# Patient Record
Sex: FEMALE | Race: BLACK OR AFRICAN AMERICAN | NOT HISPANIC OR LATINO | Employment: UNEMPLOYED | RURAL
[De-identification: names, ages, dates, MRNs, and addresses within clinical notes are randomized per-mention and may not be internally consistent; named-entity substitution may affect disease eponyms.]

---

## 2017-01-05 RX ORDER — DIPHENHYDRAMINE HCL 25 MG
TABLET ORAL
Qty: 30 TABLET | Refills: 3 | Status: SHIPPED | OUTPATIENT
Start: 2017-01-05 | End: 2017-01-10

## 2017-01-10 ENCOUNTER — OFFICE VISIT (OUTPATIENT)
Dept: FAMILY MEDICINE CLINIC | Facility: CLINIC | Age: 42
End: 2017-01-10

## 2017-01-10 VITALS
BODY MASS INDEX: 25.33 KG/M2 | WEIGHT: 152 LBS | HEIGHT: 65 IN | HEART RATE: 99 BPM | TEMPERATURE: 98.2 F | SYSTOLIC BLOOD PRESSURE: 120 MMHG | RESPIRATION RATE: 16 BRPM | DIASTOLIC BLOOD PRESSURE: 80 MMHG

## 2017-01-10 DIAGNOSIS — E21.5 PARATHYROID ABNORMALITY (HCC): ICD-10-CM

## 2017-01-10 DIAGNOSIS — I10 ESSENTIAL HYPERTENSION: ICD-10-CM

## 2017-01-10 DIAGNOSIS — J30.2 SEASONAL ALLERGIC RHINITIS, UNSPECIFIED ALLERGIC RHINITIS TRIGGER: ICD-10-CM

## 2017-01-10 DIAGNOSIS — R51.9 NONINTRACTABLE HEADACHE, UNSPECIFIED CHRONICITY PATTERN, UNSPECIFIED HEADACHE TYPE: Primary | ICD-10-CM

## 2017-01-10 PROCEDURE — 99214 OFFICE O/P EST MOD 30 MIN: CPT | Performed by: NURSE PRACTITIONER

## 2017-01-10 RX ORDER — DIPHENHYDRAMINE HCL 25 MG
25 CAPSULE ORAL EVERY 4 HOURS PRN
Qty: 30 CAPSULE | Refills: 5 | Status: SHIPPED | OUTPATIENT
Start: 2017-01-10 | End: 2017-08-22 | Stop reason: SDUPTHER

## 2017-01-10 RX ORDER — IBUPROFEN 600 MG/1
600 TABLET ORAL EVERY 8 HOURS PRN
Qty: 90 TABLET | Refills: 3 | Status: SHIPPED | OUTPATIENT
Start: 2017-01-10 | End: 2017-08-16 | Stop reason: SDUPTHER

## 2017-01-10 RX ORDER — NORTRIPTYLINE HYDROCHLORIDE 50 MG/1
50 CAPSULE ORAL NIGHTLY
Qty: 30 CAPSULE | Refills: 3 | Status: SHIPPED | OUTPATIENT
Start: 2017-01-10 | End: 2019-10-17

## 2017-01-10 RX ORDER — ALCOHOL 62 ML/100ML
LIQUID TOPICAL
Refills: 0 | COMMUNITY
Start: 2016-10-19 | End: 2017-05-12 | Stop reason: SDUPTHER

## 2017-01-10 NOTE — PROGRESS NOTES
Subjective   Swapna Schaffer is a 41 y.o. female.     Headache    This is a chronic problem. The current episode started more than 1 month ago. The problem occurs daily. The problem has been unchanged. The pain is located in the frontal region. The pain does not radiate. The pain quality is similar to prior headaches. The quality of the pain is described as band-like. The pain is at a severity of 2/10. The pain is mild. Associated symptoms include weight loss. Pertinent negatives include no nausea. The symptoms are aggravated by bright light. She has tried NSAIDs (pamelor) for the symptoms. The treatment provided moderate relief.   Weight Loss   This is a recurrent (has lost 4 lbs since she was last seen.  has a hx of parathyroid disease.  had partial removal of parathyroid about 6 months ago.  was done in [place] and is not following up with an endocrinologist.) problem. The current episode started more than 1 year ago. Associated symptoms include headaches. Pertinent negatives include no nausea. Treatments tried: has had parathyroidectomy. The treatment provided moderate relief.        The following portions of the patient's history were reviewed and updated as appropriate: allergies, current medications, past family history, past medical history, past social history, past surgical history and problem list.    Review of Systems   Constitutional: Positive for weight loss.   Respiratory: Negative.    Cardiovascular: Negative.    Gastrointestinal: Negative for nausea.   Skin: Negative.    Neurological: Positive for headaches.   Psychiatric/Behavioral: Negative.        Objective   Physical Exam   Constitutional: She is oriented to person, place, and time. She appears well-developed and well-nourished. No distress.   HENT:   Head: Normocephalic and atraumatic.   Eyes: EOM are normal. Pupils are equal, round, and reactive to light.   Neck: Normal range of motion. Neck supple. No thyromegaly present.    Cardiovascular: Normal rate, regular rhythm and normal heart sounds.  Exam reveals no friction rub.    No murmur heard.  Pulmonary/Chest: Effort normal and breath sounds normal. No respiratory distress. She has no wheezes. She has no rales.   Abdominal: Soft. Bowel sounds are normal.   Musculoskeletal: Normal range of motion.   Neurological: She is alert and oriented to person, place, and time.   Skin: Skin is warm and dry.   Psychiatric: She has a normal mood and affect. Thought content normal.   Nursing note and vitals reviewed.      Assessment/Plan   Swapna was seen today for headache and weight loss.    Diagnoses and all orders for this visit:    Nonintractable headache, unspecified chronicity pattern, unspecified headache type  -     nortriptyline (PAMELOR) 50 MG capsule; Take 1 capsule by mouth Every Night.  -     ibuprofen (ADVIL,MOTRIN) 600 MG tablet; Take 1 tablet by mouth Every 8 (Eight) Hours As Needed for mild pain (1-3). Take 1 tablet 3 times per day with food.    Seasonal allergic rhinitis, unspecified allergic rhinitis trigger  -     diphenhydrAMINE (BENADRYL) 25 mg capsule; Take 1 capsule by mouth Every 4 (Four) Hours As Needed for itching. Take 1/2 tablet every 4 hours as needed.    Parathyroid abnormality  -     PTH, Intact; Future  -     Calcium; Future  -     TSH; Future    Essential hypertension  -     Comprehensive Metabolic Panel; Future    will have her increase the pamelor to 50mg.  Also she will be referred to endocrinologist due to her past hx of parathyroid disease.  New labs will be drawn.

## 2017-01-10 NOTE — MR AVS SNAPSHOT
Swapna Schaffer   1/10/2017 4:00 PM   Office Visit    Dept Phone:  [phone]   Encounter #:  [id number]    Provider:  SIS Solis   Department:  [facility]                Your Full Care Plan              Today's Medication Changes          These changes are accurate as of: 1/10/17  4:35 PM.  If you have any questions, ask your nurse or doctor.               Medication(s)that have changed:     diphenhydrAMINE 25 mg capsule   Commonly known as:  BENADRYL   Take 1 capsule by mouth Every 4 (Four) Hours As Needed for itching. Take 1/2 tablet every 4 hours as needed.   What changed:    - how much to take  - when to take this  - reasons to take this   Changed by:  SIS Solis       nortriptyline 50 MG capsule   Commonly known as:  PAMELOR   Take 1 capsule by mouth Every Night.   What changed:    - medication strength  - how much to take   Changed by:  SIS Solis         Stop taking medication(s)listed here:     RA NIGHTTIME SLEEP AID 25 MG tablet   Generic drug:  diphenhydrAMINE   Stopped by:  SIS Solis                Where to Get Your Medications      These medications were sent to [facility]-[address] [place] [address] - [address] [facility] - [phone]  - [phone] [address] [address]     Phone:  [phone]     diphenhydrAMINE 25 mg capsule    ibuprofen 600 MG tablet    nortriptyline 50 MG capsule                  Your Updated Medication List          This list is accurate as of: 1/10/17  4:35 PM.  Always use your most recent med list.                CLARITIN-D 24 HOUR PO       cyclobenzaprine 5 MG tablet   Commonly known as:  FLEXERIL   Take 1 tablet by mouth 2 (Two) Times a Day As Needed for muscle spasms. Take 1 or 2 tablets at bedtime.       diphenhydrAMINE 25 mg capsule   Commonly known as:  BENADRYL   Take 1 capsule by mouth  Every 4 (Four) Hours As Needed for itching. Take 1/2 tablet every 4 hours as needed.       ferrous sulfate 325 (65 FE) MG tablet       ibuprofen 600 MG tablet   Commonly known as:  ADVIL,MOTRIN   Take 1 tablet by mouth Every 8 (Eight) Hours As Needed for mild pain (1-3). Take 1 tablet 3 times per day with food.       losartan 50 MG tablet   Commonly known as:  COZAAR   Take 1 tablet by mouth Daily.       nortriptyline 50 MG capsule   Commonly known as:  PAMELOR   Take 1 capsule by mouth Every Night.       RA LORATADINE 10 MG tablet   Generic drug:  loratadine               You Were Diagnosed With        Codes Comments    Nonintractable headache, unspecified chronicity pattern, unspecified headache type    -  Primary ICD-10-CM: R51  ICD-9-CM: 784.0     Seasonal allergic rhinitis, unspecified allergic rhinitis trigger     ICD-10-CM: J30.2  ICD-9-CM: 477.9     Parathyroid abnormality     ICD-10-CM: E21.5  ICD-9-CM: 252.9     Essential hypertension     ICD-10-CM: I10  ICD-9-CM: 401.9       Instructions     None    Patient Instructions History      Upcoming Appointments     Visit Type Date Time Department    OFFICE VISIT 1/10/2017  4:00 PM [facility]    OFFICE VISIT 4/10/2017  4:00 PM [facility]    PAP SMEAR/PELVIC EXAM 10/3/2017  2:30 PM [facility]      [facility] Signup     Our records indicate that you have declined [facility] signup. If you would like to sign up for [email or web address], please email [email or web address] or call [phone] to obtain an activation code.             Other Info from Your Visit           Your Appointments     Apr 10, 2017  4:00 PM CDT   Office Visit with SIS Solis   [facility] (--)    [place] [address] [place] [address]  [facility] [address] [phone]           Arrive 15 minutes prior to appointment.            Oct 03, 2017  2:30 PM CDT   Pap  "Smear/Pelvic Exam with Clark Ryder MD   [facility] (--)    [address] [place] [address] [address]  [facility] [address] [place] [id number] [phone]              Allergies     Naproxen      Vicodin [Hydrocodone-acetaminophen]        Reason for Visit     Anxiety re.ck      Vital Signs     Blood Pressure Pulse Temperature Respirations Height Weight    120/80 99 98.2 °F (36.8 °C) 16 65\" (165.1 cm) 152 lb (68.9 kg)    Body Mass Index Smoking Status                25.29 kg/m2 Current Every Day Smoker          Problems and Diagnoses Noted     High blood pressure    Nonintractable headache    Seasonal allergic reaction        Parathyroid abnormality            "

## 2017-02-07 ENCOUNTER — LAB (OUTPATIENT)
Dept: LAB | Facility: CLINIC | Age: 42
End: 2017-02-07

## 2017-02-07 DIAGNOSIS — E21.5 PARATHYROID ABNORMALITY (HCC): ICD-10-CM

## 2017-02-07 DIAGNOSIS — I10 ESSENTIAL HYPERTENSION: ICD-10-CM

## 2017-02-07 PROCEDURE — 36415 COLL VENOUS BLD VENIPUNCTURE: CPT | Performed by: NURSE PRACTITIONER

## 2017-02-07 PROCEDURE — 83970 ASSAY OF PARATHORMONE: CPT | Performed by: NURSE PRACTITIONER

## 2017-02-07 PROCEDURE — 80053 COMPREHEN METABOLIC PANEL: CPT | Performed by: NURSE PRACTITIONER

## 2017-02-07 PROCEDURE — 84443 ASSAY THYROID STIM HORMONE: CPT | Performed by: NURSE PRACTITIONER

## 2017-02-08 LAB
ALBUMIN SERPL-MCNC: 3.9 G/DL (ref 3.4–4.8)
ALBUMIN/GLOB SERPL: 1.4 G/DL (ref 1.1–1.8)
ALP SERPL-CCNC: 67 U/L (ref 38–126)
ALT SERPL W P-5'-P-CCNC: 23 U/L (ref 9–52)
ANION GAP SERPL CALCULATED.3IONS-SCNC: 10 MMOL/L (ref 5–15)
AST SERPL-CCNC: 16 U/L (ref 14–36)
BILIRUB SERPL-MCNC: 0.4 MG/DL (ref 0.2–1.3)
BUN BLD-MCNC: 10 MG/DL (ref 7–21)
BUN/CREAT SERPL: 15.4 (ref 7–25)
CALCIUM SPEC-SCNC: 10.8 MG/DL (ref 8.4–10.2)
CHLORIDE SERPL-SCNC: 105 MMOL/L (ref 95–110)
CO2 SERPL-SCNC: 23 MMOL/L (ref 22–31)
CREAT BLD-MCNC: 0.65 MG/DL (ref 0.5–1)
GFR SERPL CREATININE-BSD FRML MDRD: 121 ML/MIN/1.73 (ref 58–135)
GLOBULIN UR ELPH-MCNC: 2.8 GM/DL (ref 2.3–3.5)
GLUCOSE BLD-MCNC: 90 MG/DL (ref 60–100)
POTASSIUM BLD-SCNC: 4.2 MMOL/L (ref 3.5–5.1)
PROT SERPL-MCNC: 6.7 G/DL (ref 6.3–8.6)
SODIUM BLD-SCNC: 138 MMOL/L (ref 137–145)
TSH SERPL DL<=0.05 MIU/L-ACNC: 0.82 MIU/ML (ref 0.46–4.68)

## 2017-02-09 LAB — PTH-INTACT SERPL-MCNC: 100 PG/ML (ref 15–65)

## 2017-04-10 ENCOUNTER — OFFICE VISIT (OUTPATIENT)
Dept: FAMILY MEDICINE CLINIC | Facility: CLINIC | Age: 42
End: 2017-04-10

## 2017-04-10 VITALS
SYSTOLIC BLOOD PRESSURE: 126 MMHG | HEART RATE: 94 BPM | HEIGHT: 65 IN | DIASTOLIC BLOOD PRESSURE: 84 MMHG | BODY MASS INDEX: 24.26 KG/M2 | RESPIRATION RATE: 16 BRPM | TEMPERATURE: 95.6 F | OXYGEN SATURATION: 99 % | WEIGHT: 145.6 LBS

## 2017-04-10 DIAGNOSIS — R63.4 WEIGHT LOSS: Primary | ICD-10-CM

## 2017-04-10 DIAGNOSIS — E21.5 PARATHYROID ABNORMALITY (HCC): ICD-10-CM

## 2017-04-10 PROCEDURE — 99213 OFFICE O/P EST LOW 20 MIN: CPT | Performed by: NURSE PRACTITIONER

## 2017-04-10 RX ORDER — CYPROHEPTADINE HYDROCHLORIDE 4 MG/1
4 TABLET ORAL 2 TIMES DAILY
Qty: 60 TABLET | Refills: 1 | Status: SHIPPED | OUTPATIENT
Start: 2017-04-10 | End: 2017-05-12 | Stop reason: SDUPTHER

## 2017-04-11 PROBLEM — R63.4 WEIGHT LOSS: Status: ACTIVE | Noted: 2017-04-11

## 2017-04-11 NOTE — PROGRESS NOTES
Subjective   Swapna Schaffer is a 41 y.o. female.     HPI Comments: Here today for karma.  She cont to steadily loose weight in spite of not trying.  She has a hx of parathyroid disease.  Has an appointment with endocrinology, but this appointment has been cancelled and rescheduled to July by the endo.  She does not feel that she can wait that long.  parathyroids were removed in 2105.    Weight Loss   This is a new (has lost about 11 lbs since december) problem. The current episode started more than 1 month ago. The problem occurs constantly. The problem has been waxing and waning. Associated symptoms include fatigue. Pertinent negatives include no abdominal pain, anorexia, arthralgias, change in bowel habit, chest pain, chills, congestion, coughing, diaphoresis, fever, headaches, joint swelling, myalgias, nausea, neck pain, numbness, rash, sore throat, swollen glands, urinary symptoms, vertigo, visual change, vomiting or weakness. Nothing aggravates the symptoms. She has tried nothing for the symptoms. The treatment provided no relief.        The following portions of the patient's history were reviewed and updated as appropriate: allergies, current medications, past family history, past medical history, past social history, past surgical history and problem list.    Review of Systems   Constitutional: Positive for fatigue and weight loss. Negative for chills, diaphoresis and fever.   HENT: Negative.  Negative for congestion and sore throat.    Respiratory: Negative.  Negative for cough.    Cardiovascular: Negative.  Negative for chest pain.   Gastrointestinal: Negative for abdominal pain, anorexia, change in bowel habit, nausea and vomiting.   Musculoskeletal: Negative.  Negative for arthralgias, joint swelling, myalgias and neck pain.   Skin: Negative.  Negative for rash.   Neurological: Negative.  Negative for vertigo, weakness, numbness and headaches.   Psychiatric/Behavioral: Negative.         Objective   Physical Exam   Constitutional: She is oriented to person, place, and time. She appears well-developed and well-nourished. No distress.   HENT:   Head: Normocephalic.   Eyes: EOM are normal. Pupils are equal, round, and reactive to light.   Neck: Normal range of motion. Neck supple. No thyromegaly present.   Cardiovascular: Normal rate, regular rhythm and normal heart sounds.  Exam reveals no friction rub.    No murmur heard.  Pulmonary/Chest: Effort normal and breath sounds normal. No respiratory distress. She has no wheezes. She has no rales.   Abdominal: Soft.   Musculoskeletal: Normal range of motion.   Neurological: She is alert and oriented to person, place, and time.   Skin: Skin is warm and dry.   Psychiatric: She has a normal mood and affect. Thought content normal.   Nursing note and vitals reviewed.      Assessment/Plan   Swapna was seen today for follow-up.    Diagnoses and all orders for this visit:    Weight loss  -     cyproheptadine (PERIACTIN) 4 MG tablet; Take 1 tablet by mouth 2 (Two) Times a Day.    Parathyroid abnormality    will start her on periactin and see if this will stabilize her weight.  Have reviewed prev labs.  Will try to get her back in to see flori in [place].

## 2017-05-12 ENCOUNTER — OFFICE VISIT (OUTPATIENT)
Dept: FAMILY MEDICINE CLINIC | Facility: CLINIC | Age: 42
End: 2017-05-12

## 2017-05-12 VITALS
RESPIRATION RATE: 18 BRPM | WEIGHT: 157 LBS | TEMPERATURE: 98 F | HEART RATE: 104 BPM | HEIGHT: 65 IN | OXYGEN SATURATION: 99 % | BODY MASS INDEX: 26.16 KG/M2 | SYSTOLIC BLOOD PRESSURE: 140 MMHG | DIASTOLIC BLOOD PRESSURE: 82 MMHG

## 2017-05-12 DIAGNOSIS — I10 ESSENTIAL HYPERTENSION: ICD-10-CM

## 2017-05-12 DIAGNOSIS — R63.4 WEIGHT LOSS: ICD-10-CM

## 2017-05-12 PROCEDURE — 99213 OFFICE O/P EST LOW 20 MIN: CPT | Performed by: NURSE PRACTITIONER

## 2017-05-12 RX ORDER — LOSARTAN POTASSIUM 50 MG/1
50 TABLET ORAL DAILY
Qty: 30 TABLET | Refills: 5 | Status: SHIPPED | OUTPATIENT
Start: 2017-05-12 | End: 2019-04-26 | Stop reason: SDUPTHER

## 2017-05-12 RX ORDER — CYPROHEPTADINE HYDROCHLORIDE 4 MG/1
4 TABLET ORAL 2 TIMES DAILY
Qty: 60 TABLET | Refills: 5 | Status: SHIPPED | OUTPATIENT
Start: 2017-05-12 | End: 2017-08-22 | Stop reason: SDUPTHER

## 2017-05-16 DIAGNOSIS — E21.5 PARATHYROID DISEASE (HCC): Primary | ICD-10-CM

## 2017-08-16 DIAGNOSIS — R51.9 NONINTRACTABLE HEADACHE, UNSPECIFIED CHRONICITY PATTERN, UNSPECIFIED HEADACHE TYPE: ICD-10-CM

## 2017-08-17 RX ORDER — IBUPROFEN 600 MG/1
TABLET ORAL
Qty: 90 TABLET | Refills: 3 | Status: SHIPPED | OUTPATIENT
Start: 2017-08-17 | End: 2017-08-22 | Stop reason: SDUPTHER

## 2017-08-22 ENCOUNTER — OFFICE VISIT (OUTPATIENT)
Dept: FAMILY MEDICINE CLINIC | Facility: CLINIC | Age: 42
End: 2017-08-22

## 2017-08-22 VITALS
WEIGHT: 169 LBS | RESPIRATION RATE: 16 BRPM | DIASTOLIC BLOOD PRESSURE: 86 MMHG | SYSTOLIC BLOOD PRESSURE: 117 MMHG | HEIGHT: 65 IN | HEART RATE: 101 BPM | BODY MASS INDEX: 28.16 KG/M2 | TEMPERATURE: 97.9 F | OXYGEN SATURATION: 98 %

## 2017-08-22 DIAGNOSIS — I10 ESSENTIAL HYPERTENSION: Primary | ICD-10-CM

## 2017-08-22 DIAGNOSIS — J30.2 SEASONAL ALLERGIC RHINITIS, UNSPECIFIED ALLERGIC RHINITIS TRIGGER: ICD-10-CM

## 2017-08-22 DIAGNOSIS — R51.9 NONINTRACTABLE HEADACHE, UNSPECIFIED CHRONICITY PATTERN, UNSPECIFIED HEADACHE TYPE: ICD-10-CM

## 2017-08-22 DIAGNOSIS — R63.4 WEIGHT LOSS: ICD-10-CM

## 2017-08-22 PROCEDURE — 99214 OFFICE O/P EST MOD 30 MIN: CPT | Performed by: NURSE PRACTITIONER

## 2017-08-22 RX ORDER — CYPROHEPTADINE HYDROCHLORIDE 4 MG/1
4 TABLET ORAL 2 TIMES DAILY
Qty: 60 TABLET | Refills: 5 | Status: SHIPPED | OUTPATIENT
Start: 2017-08-22 | End: 2018-01-02 | Stop reason: SDUPTHER

## 2017-08-22 RX ORDER — IBUPROFEN 600 MG/1
600 TABLET ORAL EVERY 6 HOURS PRN
Qty: 90 TABLET | Refills: 3 | Status: SHIPPED | OUTPATIENT
Start: 2017-08-22 | End: 2018-04-05 | Stop reason: SDUPTHER

## 2017-08-22 RX ORDER — DIPHENHYDRAMINE HCL 25 MG
25 CAPSULE ORAL EVERY 4 HOURS PRN
Qty: 30 CAPSULE | Refills: 5 | Status: SHIPPED | OUTPATIENT
Start: 2017-08-22 | End: 2018-01-02 | Stop reason: SDUPTHER

## 2017-08-22 NOTE — PROGRESS NOTES
Subjective   Swapna Schaffer is a 41 y.o. female.     HPI Comments: Here today needing refills on some of her meds.  She has stopped losing weight and is feeling well.  Needs refills.    Hypertension   This is a chronic problem. The current episode started more than 1 year ago. The problem is controlled. Pertinent negatives include no anxiety, blurred vision, chest pain, headaches, malaise/fatigue, neck pain, orthopnea, palpitations, peripheral edema, PND, shortness of breath or sweats. There are no known risk factors for coronary artery disease. Past treatments include angiotensin blockers. There are no compliance problems.  There is no history of angina, kidney disease, CAD/MI, CVA, heart failure, left ventricular hypertrophy, PVD or retinopathy.        The following portions of the patient's history were reviewed and updated as appropriate: allergies, current medications, past family history, past medical history, past social history, past surgical history and problem list.    Review of Systems   Constitutional: Negative.  Negative for malaise/fatigue.   HENT: Negative.    Eyes: Negative for blurred vision.   Respiratory: Negative.  Negative for shortness of breath.    Cardiovascular: Negative.  Negative for chest pain, palpitations, orthopnea and PND.   Musculoskeletal: Negative.  Negative for neck pain.   Skin: Negative.    Neurological: Negative.  Negative for headaches.   Psychiatric/Behavioral: Negative.        Objective   Physical Exam   Constitutional: She is oriented to person, place, and time. She appears well-developed and well-nourished. No distress.   HENT:   Head: Normocephalic.   Eyes: Pupils are equal, round, and reactive to light.   Neck: Normal range of motion. Neck supple. No thyromegaly present.   Cardiovascular: Normal rate, regular rhythm and normal heart sounds.  Exam reveals no friction rub.    No murmur heard.  Pulmonary/Chest: Effort normal and breath sounds normal. No  respiratory distress. She has no wheezes. She has no rales.   Abdominal: Soft.   Musculoskeletal: Normal range of motion.   Neurological: She is alert and oriented to person, place, and time.   Skin: Skin is warm and dry.   Psychiatric: She has a normal mood and affect. Thought content normal.   Nursing note and vitals reviewed.      Assessment/Plan   Swapna was seen today for hypertension and low iron.    Diagnoses and all orders for this visit:    Essential hypertension    Nonintractable headache, unspecified chronicity pattern, unspecified headache type  -     ibuprofen (ADVIL,MOTRIN) 600 MG tablet; Take 1 tablet by mouth Every 6 (Six) Hours As Needed for Mild Pain .    Weight loss  -     cyproheptadine (PERIACTIN) 4 MG tablet; Take 1 tablet by mouth 2 (Two) Times a Day.    Seasonal allergic rhinitis, unspecified allergic rhinitis trigger  -     diphenhydrAMINE (BENADRYL) 25 mg capsule; Take 1 capsule by mouth Every 4 (Four) Hours As Needed for Itching. Take 1/2 tablet every 4 hours as needed.      She is given refills on maintenance meds.

## 2018-01-02 DIAGNOSIS — R63.4 WEIGHT LOSS: ICD-10-CM

## 2018-01-02 RX ORDER — DIPHENHYDRAMINE HCL 25 MG
25 CAPSULE ORAL EVERY 4 HOURS PRN
Qty: 30 CAPSULE | Refills: 5 | Status: SHIPPED | OUTPATIENT
Start: 2018-01-02 | End: 2018-03-29 | Stop reason: SDUPTHER

## 2018-01-02 RX ORDER — CYPROHEPTADINE HYDROCHLORIDE 4 MG/1
4 TABLET ORAL 2 TIMES DAILY PRN
Qty: 60 TABLET | Refills: 5 | Status: SHIPPED | OUTPATIENT
Start: 2018-01-02 | End: 2018-07-13 | Stop reason: SDUPTHER

## 2018-01-03 RX ORDER — CYPROHEPTADINE HYDROCHLORIDE 4 MG/1
TABLET ORAL
Qty: 60 TABLET | Refills: 5 | Status: SHIPPED | OUTPATIENT
Start: 2018-01-03 | End: 2018-07-13 | Stop reason: SDUPTHER

## 2018-03-15 ENCOUNTER — OFFICE VISIT (OUTPATIENT)
Dept: FAMILY MEDICINE CLINIC | Facility: CLINIC | Age: 43
End: 2018-03-15

## 2018-03-15 VITALS
DIASTOLIC BLOOD PRESSURE: 95 MMHG | HEIGHT: 65 IN | BODY MASS INDEX: 28.66 KG/M2 | SYSTOLIC BLOOD PRESSURE: 126 MMHG | RESPIRATION RATE: 20 BRPM | TEMPERATURE: 98.1 F | HEART RATE: 115 BPM | OXYGEN SATURATION: 99 % | WEIGHT: 172 LBS

## 2018-03-15 DIAGNOSIS — J01.10 ACUTE FRONTAL SINUSITIS, RECURRENCE NOT SPECIFIED: Primary | ICD-10-CM

## 2018-03-15 PROCEDURE — 96372 THER/PROPH/DIAG INJ SC/IM: CPT | Performed by: NURSE PRACTITIONER

## 2018-03-15 PROCEDURE — 99214 OFFICE O/P EST MOD 30 MIN: CPT | Performed by: NURSE PRACTITIONER

## 2018-03-15 RX ORDER — AZITHROMYCIN 250 MG/1
TABLET, FILM COATED ORAL
Refills: 0 | COMMUNITY
Start: 2018-03-12 | End: 2019-08-27

## 2018-03-15 RX ORDER — CEFUROXIME AXETIL 500 MG/1
500 TABLET ORAL 2 TIMES DAILY
Qty: 20 TABLET | Refills: 0 | Status: SHIPPED | OUTPATIENT
Start: 2018-03-15 | End: 2019-08-27

## 2018-03-15 RX ORDER — BENZONATATE 100 MG/1
CAPSULE ORAL
Refills: 0 | COMMUNITY
Start: 2018-03-12 | End: 2019-08-27

## 2018-03-15 RX ORDER — BETAMETHASONE SODIUM PHOSPHATE AND BETAMETHASONE ACETATE 3; 3 MG/ML; MG/ML
12 INJECTION, SUSPENSION INTRA-ARTICULAR; INTRALESIONAL; INTRAMUSCULAR; SOFT TISSUE EVERY 24 HOURS
Status: SHIPPED | OUTPATIENT
Start: 2018-03-15 | End: 2018-03-17

## 2018-03-15 RX ADMIN — BETAMETHASONE SODIUM PHOSPHATE AND BETAMETHASONE ACETATE 12 MG: 3; 3 INJECTION, SUSPENSION INTRA-ARTICULAR; INTRALESIONAL; INTRAMUSCULAR; SOFT TISSUE at 16:09

## 2018-03-15 NOTE — PROGRESS NOTES
Subjective   Swapna Schaffer is a 42 y.o. female.     Here today with cough, congestion and d/c nasal drainage that started about a week ago.  4 days ago she went to the er and they gave her a z pack and tessalon perles.  This has not helped.  Cont to have pain in her frontal sinus area.      Cough   This is a new problem. The current episode started in the past 7 days. The problem has been unchanged. The problem occurs every few minutes. The cough is productive of sputum. Associated symptoms include headaches, nasal congestion, postnasal drip and rhinorrhea. Pertinent negatives include no chest pain, ear congestion, ear pain, fever, heartburn, hemoptysis, myalgias, rash, sore throat, shortness of breath, sweats, weight loss or wheezing. Nothing aggravates the symptoms. Treatments tried: z pack and tessalon perles. The treatment provided no relief.   Sinusitis   This is a new problem. The current episode started in the past 7 days. The problem is unchanged. There has been no fever. Her pain is at a severity of 6/10. The pain is moderate. Associated symptoms include congestion, coughing, headaches and sinus pressure. Pertinent negatives include no diaphoresis, ear pain, hoarse voice, neck pain, shortness of breath, sneezing, sore throat or swollen glands. Past treatments include antibiotics (tessalon perles). The treatment provided mild relief.        The following portions of the patient's history were reviewed and updated as appropriate: allergies, current medications, past family history, past medical history, past social history, past surgical history and problem list.    Review of Systems   Constitutional: Negative.  Negative for diaphoresis, fever and unexpected weight loss.   HENT: Positive for congestion, postnasal drip, rhinorrhea and sinus pressure. Negative for ear pain, hoarse voice, sneezing and sore throat.    Respiratory: Positive for cough. Negative for hemoptysis, shortness of breath and  wheezing.    Cardiovascular: Negative.  Negative for chest pain.   Musculoskeletal: Negative.  Negative for myalgias and neck pain.   Skin: Negative.  Negative for rash.   Neurological: Positive for headaches.   Psychiatric/Behavioral: Negative.        Objective   Physical Exam   Constitutional: She is oriented to person, place, and time. She appears well-developed and well-nourished. No distress.   HENT:   Head: Normocephalic.   Right Ear: Hearing, tympanic membrane, external ear and ear canal normal. Tympanic membrane is not injected.   Left Ear: Hearing, tympanic membrane, external ear and ear canal normal. Tympanic membrane is not injected.   Nose: Rhinorrhea present. Right sinus exhibits frontal sinus tenderness. Right sinus exhibits no maxillary sinus tenderness. Left sinus exhibits frontal sinus tenderness. Left sinus exhibits no maxillary sinus tenderness.   Mouth/Throat: Oropharyngeal exudate present.   Eyes: Pupils are equal, round, and reactive to light.   Neck: Normal range of motion. Neck supple.   Cardiovascular: Normal rate, regular rhythm and normal heart sounds.  Exam reveals no friction rub.    No murmur heard.  Pulmonary/Chest: Effort normal and breath sounds normal. No respiratory distress. She has no wheezes. She has no rales.   Abdominal: Soft.   Musculoskeletal: Normal range of motion.   Neurological: She is alert and oriented to person, place, and time.   Skin: Skin is warm and dry.   Psychiatric: She has a normal mood and affect. Thought content normal.   Nursing note and vitals reviewed.        Assessment/Plan   Swapna was seen today for nasal congestion and cough.    Diagnoses and all orders for this visit:    Acute frontal sinusitis, recurrence not specified  -     cefTRIAXone (ROCEPHIN) 1 g in lidocaine PF 1% (XYLOCAINE) IM only syringe; Inject 4 mL into the shoulder, thigh, or buttocks 1 (One) Time.  -     betamethasone acetate-betamethasone sodium phosphate (CELESTONE SOLUSPAN)  injection 12 mg; Inject 2 mL into the shoulder, thigh, or buttocks Daily.    Other orders  -     cefuroxime (CEFTIN) 500 MG tablet; Take 1 tablet by mouth 2 (Two) Times a Day.  -     loratadine-pseudoephedrine (CLARITIN-D 24 HOUR)  MG per 24 hr tablet; Take 1 tablet by mouth Daily.

## 2018-03-29 ENCOUNTER — OFFICE VISIT (OUTPATIENT)
Dept: FAMILY MEDICINE CLINIC | Facility: CLINIC | Age: 43
End: 2018-03-29

## 2018-03-29 VITALS
RESPIRATION RATE: 20 BRPM | OXYGEN SATURATION: 98 % | HEIGHT: 65 IN | TEMPERATURE: 98.3 F | HEART RATE: 82 BPM | SYSTOLIC BLOOD PRESSURE: 132 MMHG | DIASTOLIC BLOOD PRESSURE: 84 MMHG | BODY MASS INDEX: 28.66 KG/M2 | WEIGHT: 172 LBS

## 2018-03-29 DIAGNOSIS — J30.2 SEASONAL ALLERGIC RHINITIS, UNSPECIFIED CHRONICITY, UNSPECIFIED TRIGGER: Primary | ICD-10-CM

## 2018-03-29 PROCEDURE — 99214 OFFICE O/P EST MOD 30 MIN: CPT | Performed by: NURSE PRACTITIONER

## 2018-03-29 RX ORDER — FLUTICASONE PROPIONATE 50 MCG
2 SPRAY, SUSPENSION (ML) NASAL DAILY
Qty: 18.2 ML | Refills: 3 | Status: SHIPPED | OUTPATIENT
Start: 2018-03-29 | End: 2019-05-22 | Stop reason: SDUPTHER

## 2018-03-29 RX ORDER — DIPHENHYDRAMINE HCL 25 MG
25 CAPSULE ORAL EVERY 4 HOURS PRN
Qty: 60 CAPSULE | Refills: 5 | Status: SHIPPED | OUTPATIENT
Start: 2018-03-29 | End: 2018-08-08 | Stop reason: SDUPTHER

## 2018-03-29 NOTE — PROGRESS NOTES
Subjective   Swapna Schaffer is a 42 y.o. female.     Here today with continued allergy sx and some hoarseness.  She reports not feeling particularly bad.      Hoarse   This is a recurrent problem. The current episode started in the past 7 days. The problem occurs constantly. The problem has been unchanged. Associated symptoms include congestion. Pertinent negatives include no abdominal pain, anorexia, arthralgias, change in bowel habit, chest pain, chills, coughing, diaphoresis, fatigue, fever, headaches, joint swelling, myalgias, nausea, neck pain, numbness, rash, sore throat, swollen glands, urinary symptoms, vertigo, visual change, vomiting or weakness. Nothing aggravates the symptoms. Treatments tried: antihistamine. The treatment provided mild relief.        The following portions of the patient's history were reviewed and updated as appropriate: allergies, current medications, past family history, past medical history, past social history, past surgical history and problem list.    Review of Systems   Constitutional: Negative.  Negative for chills, diaphoresis, fatigue and fever.   HENT: Positive for congestion and hoarse voice. Negative for sore throat.    Respiratory: Negative.  Negative for cough.    Cardiovascular: Negative.  Negative for chest pain.   Gastrointestinal: Negative for abdominal pain, anorexia, change in bowel habit, nausea and vomiting.   Musculoskeletal: Negative.  Negative for arthralgias, joint swelling, myalgias and neck pain.   Skin: Negative.  Negative for rash.   Neurological: Negative.  Negative for vertigo, weakness, numbness and headaches.   Psychiatric/Behavioral: Negative.        Objective   Physical Exam   Constitutional: She is oriented to person, place, and time. She appears well-developed and well-nourished. No distress.   HENT:   Head: Normocephalic.   Right Ear: Hearing, tympanic membrane, external ear and ear canal normal. Tympanic membrane is not injected.    Left Ear: Hearing, tympanic membrane, external ear and ear canal normal. Tympanic membrane is not injected.   Nose: Rhinorrhea and congestion present. Right sinus exhibits no maxillary sinus tenderness and no frontal sinus tenderness. Left sinus exhibits no maxillary sinus tenderness and no frontal sinus tenderness.   Mouth/Throat: Oropharynx is clear and moist. No oropharyngeal exudate.   Eyes: Pupils are equal, round, and reactive to light.   Neck: Normal range of motion.   Cardiovascular: Normal rate, regular rhythm and normal heart sounds.  Exam reveals no friction rub.    No murmur heard.  Pulmonary/Chest: Effort normal and breath sounds normal. No respiratory distress. She has no wheezes. She has no rales.   Abdominal: Soft.   Musculoskeletal: Normal range of motion.   Neurological: She is alert and oriented to person, place, and time.   Skin: Skin is warm and dry.   Psychiatric: She has a normal mood and affect. Thought content normal.   Nursing note and vitals reviewed.        Assessment/Plan   Swapna was seen today for hoarse.    Diagnoses and all orders for this visit:    Seasonal allergic rhinitis, unspecified chronicity, unspecified trigger  -     fluticasone (FLONASE) 50 MCG/ACT nasal spray; 2 sprays into each nostril Daily.  -     diphenhydrAMINE (BENADRYL) 25 mg capsule; Take 1 capsule by mouth Every 4 (Four) Hours As Needed for Itching. Take 1/2 tablet every 4 hours as needed.

## 2018-04-05 DIAGNOSIS — R51.9 NONINTRACTABLE HEADACHE, UNSPECIFIED CHRONICITY PATTERN, UNSPECIFIED HEADACHE TYPE: ICD-10-CM

## 2018-04-06 RX ORDER — IBUPROFEN 600 MG/1
600 TABLET ORAL EVERY 6 HOURS PRN
Qty: 90 TABLET | Refills: 3 | Status: SHIPPED | OUTPATIENT
Start: 2018-04-06 | End: 2018-09-22 | Stop reason: SDUPTHER

## 2018-06-21 DIAGNOSIS — M54.2 NECK PAIN: ICD-10-CM

## 2018-06-25 ENCOUNTER — OFFICE VISIT (OUTPATIENT)
Dept: FAMILY MEDICINE CLINIC | Facility: CLINIC | Age: 43
End: 2018-06-25

## 2018-06-25 VITALS
HEIGHT: 65 IN | DIASTOLIC BLOOD PRESSURE: 88 MMHG | OXYGEN SATURATION: 98 % | HEART RATE: 105 BPM | BODY MASS INDEX: 29.32 KG/M2 | SYSTOLIC BLOOD PRESSURE: 112 MMHG | RESPIRATION RATE: 18 BRPM | TEMPERATURE: 98.4 F | WEIGHT: 176 LBS

## 2018-06-25 DIAGNOSIS — I10 ESSENTIAL HYPERTENSION: ICD-10-CM

## 2018-06-25 DIAGNOSIS — M79.605 PAIN AND SWELLING OF LEFT LOWER EXTREMITY: Primary | ICD-10-CM

## 2018-06-25 DIAGNOSIS — M79.89 PAIN AND SWELLING OF LEFT LOWER EXTREMITY: Primary | ICD-10-CM

## 2018-06-25 PROCEDURE — 99214 OFFICE O/P EST MOD 30 MIN: CPT | Performed by: NURSE PRACTITIONER

## 2018-06-25 RX ORDER — CYCLOBENZAPRINE HCL 5 MG
5 TABLET ORAL 2 TIMES DAILY PRN
Qty: 30 TABLET | Refills: 3 | Status: SHIPPED | OUTPATIENT
Start: 2018-06-25 | End: 2019-01-21 | Stop reason: SDUPTHER

## 2018-06-26 NOTE — PROGRESS NOTES
Subjective   Swapna Schaffer is a 42 y.o. female.     Here today with swelling and pain just in the left lower ext.  Has been going on for the past 2 months.  Takes nothing for sx.  Here today for eval.      Lower Extremity Issue   This is a new problem. The current episode started more than 1 month ago. The problem occurs constantly. The problem has been waxing and waning (is much worse as the day goes on.). Associated symptoms include myalgias. Pertinent negatives include no abdominal pain, anorexia, arthralgias, change in bowel habit, chest pain, chills, congestion, coughing, diaphoresis, fatigue, fever, headaches, joint swelling, nausea, neck pain, numbness, rash, sore throat, swollen glands, urinary symptoms, vertigo, visual change, vomiting or weakness. The symptoms are aggravated by exertion, walking and standing. She has tried nothing for the symptoms. The treatment provided no relief.        The following portions of the patient's history were reviewed and updated as appropriate: allergies, current medications, past family history, past medical history, past social history, past surgical history and problem list.    Review of Systems   Constitutional: Negative.  Negative for chills, diaphoresis, fatigue and fever.   HENT: Negative.  Negative for congestion and sore throat.    Respiratory: Negative.  Negative for cough.    Cardiovascular: Positive for leg swelling (left lower leg). Negative for chest pain and palpitations.   Gastrointestinal: Negative for abdominal pain, anorexia, change in bowel habit, nausea and vomiting.   Musculoskeletal: Positive for myalgias. Negative for arthralgias, joint swelling and neck pain.   Skin: Negative for rash.   Neurological: Negative.  Negative for vertigo, weakness and numbness.   Psychiatric/Behavioral: Negative.        Objective   Physical Exam   Constitutional: She is oriented to person, place, and time. She appears well-developed and well-nourished. No  distress.   HENT:   Head: Normocephalic.   Eyes: Pupils are equal, round, and reactive to light.   Neck: Normal range of motion. Neck supple. No thyromegaly present.   Cardiovascular: Normal rate, regular rhythm and normal heart sounds.  Exam reveals no friction rub.    No murmur heard.  Pulmonary/Chest: Effort normal and breath sounds normal. No respiratory distress. She has no wheezes. She has no rales.   Musculoskeletal: Normal range of motion.   rom of left knee and ankle is good.  The leg is not swollen at this time.  Has some mild tenderness with palpation more in the left ankle area than the left calf.   Neurological: She is alert and oriented to person, place, and time.   Skin: Skin is warm and dry.   Psychiatric: She has a normal mood and affect. Thought content normal.   Nursing note and vitals reviewed.        Assessment/Plan   Swapna was seen today for edema.    Diagnoses and all orders for this visit:    Pain and swelling of left lower extremity  -     Duplex Venous Lower Extremity - Left CAR; Future    Essential hypertension  -     Comprehensive metabolic panel    she has not had any labs drawn in a while.  Will get these today also due to her hx of hypertension.  Will get doppler of left lower ext.

## 2018-06-27 ENCOUNTER — TELEPHONE (OUTPATIENT)
Dept: FAMILY MEDICINE CLINIC | Facility: CLINIC | Age: 43
End: 2018-06-27

## 2018-06-29 RX ORDER — IBUPROFEN 800 MG/1
800 TABLET ORAL EVERY 6 HOURS PRN
Qty: 30 TABLET | Refills: 3 | Status: SHIPPED | OUTPATIENT
Start: 2018-06-29 | End: 2018-10-23 | Stop reason: SDUPTHER

## 2018-07-06 DIAGNOSIS — M79.605 PAIN AND SWELLING OF LEFT LOWER EXTREMITY: ICD-10-CM

## 2018-07-06 DIAGNOSIS — M79.89 PAIN AND SWELLING OF LEFT LOWER EXTREMITY: ICD-10-CM

## 2018-07-13 DIAGNOSIS — R63.4 WEIGHT LOSS: ICD-10-CM

## 2018-07-13 RX ORDER — CYPROHEPTADINE HYDROCHLORIDE 4 MG/1
4 TABLET ORAL 2 TIMES DAILY PRN
Qty: 60 TABLET | Refills: 5 | Status: SHIPPED | OUTPATIENT
Start: 2018-07-13 | End: 2019-01-14 | Stop reason: SDUPTHER

## 2018-08-08 DIAGNOSIS — J30.2 SEASONAL ALLERGIC RHINITIS, UNSPECIFIED CHRONICITY, UNSPECIFIED TRIGGER: ICD-10-CM

## 2018-08-09 RX ORDER — DIPHENHYDRAMINE HYDROCHLORIDE 25 MG/1
CAPSULE ORAL
Qty: 30 CAPSULE | Refills: 2 | Status: SHIPPED | OUTPATIENT
Start: 2018-08-09 | End: 2018-10-23 | Stop reason: SDUPTHER

## 2018-09-22 DIAGNOSIS — R51.9 NONINTRACTABLE HEADACHE, UNSPECIFIED CHRONICITY PATTERN, UNSPECIFIED HEADACHE TYPE: ICD-10-CM

## 2018-09-24 RX ORDER — IBUPROFEN 600 MG/1
TABLET ORAL
Qty: 90 TABLET | Refills: 2 | Status: SHIPPED | OUTPATIENT
Start: 2018-09-24 | End: 2018-12-24 | Stop reason: SDUPTHER

## 2018-10-15 ENCOUNTER — TELEPHONE (OUTPATIENT)
Dept: FAMILY MEDICINE CLINIC | Facility: CLINIC | Age: 43
End: 2018-10-15

## 2018-10-15 NOTE — TELEPHONE ENCOUNTER
Patient needs a not or script stating what type compression brace you want for her work, because they will purchase it for her.

## 2018-10-16 NOTE — TELEPHONE ENCOUNTER
Call that # on that card and ask them what they want her to have and how do they want the script written.

## 2018-10-23 DIAGNOSIS — J30.2 SEASONAL ALLERGIC RHINITIS: ICD-10-CM

## 2018-10-25 RX ORDER — DIPHENHYDRAMINE HYDROCHLORIDE 25 MG/1
CAPSULE ORAL
Qty: 30 CAPSULE | Refills: 2 | Status: SHIPPED | OUTPATIENT
Start: 2018-10-25 | End: 2019-01-21 | Stop reason: SDUPTHER

## 2018-10-25 RX ORDER — IBUPROFEN 800 MG/1
800 TABLET ORAL EVERY 6 HOURS PRN
Qty: 30 TABLET | Refills: 3 | Status: SHIPPED | OUTPATIENT
Start: 2018-10-25 | End: 2019-01-31 | Stop reason: SDUPTHER

## 2018-12-24 DIAGNOSIS — R51.9 NONINTRACTABLE HEADACHE, UNSPECIFIED CHRONICITY PATTERN, UNSPECIFIED HEADACHE TYPE: ICD-10-CM

## 2018-12-24 RX ORDER — IBUPROFEN 600 MG/1
TABLET ORAL
Qty: 90 TABLET | Refills: 2 | Status: SHIPPED | OUTPATIENT
Start: 2018-12-24 | End: 2019-02-20 | Stop reason: SDUPTHER

## 2019-01-14 DIAGNOSIS — R63.4 WEIGHT LOSS: ICD-10-CM

## 2019-01-14 RX ORDER — CYPROHEPTADINE HYDROCHLORIDE 4 MG/1
4 TABLET ORAL 2 TIMES DAILY PRN
Qty: 60 TABLET | Refills: 5 | Status: SHIPPED | OUTPATIENT
Start: 2019-01-14 | End: 2019-08-27 | Stop reason: SDUPTHER

## 2019-01-21 DIAGNOSIS — M54.2 NECK PAIN: ICD-10-CM

## 2019-01-21 DIAGNOSIS — J30.2 SEASONAL ALLERGIC RHINITIS: ICD-10-CM

## 2019-01-21 RX ORDER — DIPHENHYDRAMINE HYDROCHLORIDE 25 MG/1
CAPSULE ORAL
Qty: 30 CAPSULE | Refills: 2 | Status: SHIPPED | OUTPATIENT
Start: 2019-01-21 | End: 2019-04-29 | Stop reason: SDUPTHER

## 2019-01-21 RX ORDER — CYCLOBENZAPRINE HCL 5 MG
5 TABLET ORAL 2 TIMES DAILY PRN
Qty: 30 TABLET | Refills: 3 | Status: SHIPPED | OUTPATIENT
Start: 2019-01-21 | End: 2019-11-02 | Stop reason: SDUPTHER

## 2019-02-04 RX ORDER — IBUPROFEN 800 MG/1
800 TABLET ORAL EVERY 6 HOURS PRN
Qty: 30 TABLET | Refills: 3 | Status: SHIPPED | OUTPATIENT
Start: 2019-02-04 | End: 2019-05-17

## 2019-02-20 DIAGNOSIS — R51.9 NONINTRACTABLE HEADACHE, UNSPECIFIED CHRONICITY PATTERN, UNSPECIFIED HEADACHE TYPE: ICD-10-CM

## 2019-02-21 RX ORDER — IBUPROFEN 600 MG/1
TABLET ORAL
Qty: 90 TABLET | Refills: 2 | Status: SHIPPED | OUTPATIENT
Start: 2019-02-21 | End: 2019-05-17

## 2019-04-26 DIAGNOSIS — I10 ESSENTIAL HYPERTENSION: ICD-10-CM

## 2019-04-26 RX ORDER — LOSARTAN POTASSIUM 50 MG/1
TABLET ORAL
Qty: 30 TABLET | Refills: 0 | Status: SHIPPED | OUTPATIENT
Start: 2019-04-26 | End: 2019-09-23 | Stop reason: SDUPTHER

## 2019-04-29 DIAGNOSIS — J30.2 SEASONAL ALLERGIC RHINITIS: ICD-10-CM

## 2019-04-30 ENCOUNTER — TELEPHONE (OUTPATIENT)
Dept: FAMILY MEDICINE CLINIC | Facility: CLINIC | Age: 44
End: 2019-04-30

## 2019-04-30 RX ORDER — DIPHENHYDRAMINE HCL 25 MG
25 CAPSULE ORAL EVERY 4 HOURS PRN
Qty: 60 CAPSULE | Refills: 5 | Status: SHIPPED | OUTPATIENT
Start: 2019-04-30 | End: 2019-05-02 | Stop reason: SDUPTHER

## 2019-04-30 NOTE — TELEPHONE ENCOUNTER
Chio from [facility] pharmacy called to clarify directions for benadryl RX that was sent. There are two sets of directions on script. Please call.

## 2019-05-02 ENCOUNTER — TELEPHONE (OUTPATIENT)
Dept: FAMILY MEDICINE CLINIC | Facility: CLINIC | Age: 44
End: 2019-05-02

## 2019-05-02 DIAGNOSIS — J30.2 SEASONAL ALLERGIC RHINITIS: ICD-10-CM

## 2019-05-02 RX ORDER — DIPHENHYDRAMINE HCL 25 MG
25 CAPSULE ORAL EVERY 4 HOURS PRN
Qty: 60 CAPSULE | Refills: 5 | Status: SHIPPED | OUTPATIENT
Start: 2019-05-02 | End: 2019-05-17 | Stop reason: SDUPTHER

## 2019-05-02 NOTE — TELEPHONE ENCOUNTER
Chio from [facility] pharmacy called to clarify directions for benadryl RX that was sent. There are two sets of directions on script. Please call.   If she is not there ask for Jeff or Soila.

## 2019-05-17 ENCOUNTER — OFFICE VISIT (OUTPATIENT)
Dept: FAMILY MEDICINE CLINIC | Facility: CLINIC | Age: 44
End: 2019-05-17

## 2019-05-17 VITALS
OXYGEN SATURATION: 98 % | SYSTOLIC BLOOD PRESSURE: 122 MMHG | DIASTOLIC BLOOD PRESSURE: 80 MMHG | HEART RATE: 105 BPM | TEMPERATURE: 97.9 F | WEIGHT: 163.3 LBS | BODY MASS INDEX: 27.17 KG/M2

## 2019-05-17 DIAGNOSIS — Z72.0 TOBACCO ABUSE: ICD-10-CM

## 2019-05-17 DIAGNOSIS — J30.2 SEASONAL ALLERGIC RHINITIS: ICD-10-CM

## 2019-05-17 DIAGNOSIS — M25.511 ACUTE PAIN OF RIGHT SHOULDER: Primary | ICD-10-CM

## 2019-05-17 DIAGNOSIS — R51.9 NONINTRACTABLE HEADACHE, UNSPECIFIED CHRONICITY PATTERN, UNSPECIFIED HEADACHE TYPE: ICD-10-CM

## 2019-05-17 PROCEDURE — 99214 OFFICE O/P EST MOD 30 MIN: CPT | Performed by: NURSE PRACTITIONER

## 2019-05-17 RX ORDER — IBUPROFEN 600 MG/1
600 TABLET ORAL EVERY 8 HOURS PRN
Qty: 90 TABLET | Refills: 2 | Status: SHIPPED | OUTPATIENT
Start: 2019-05-17 | End: 2019-05-22 | Stop reason: SDUPTHER

## 2019-05-17 RX ORDER — DIPHENHYDRAMINE HCL 25 MG
25 CAPSULE ORAL EVERY 6 HOURS PRN
Qty: 120 CAPSULE | Refills: 5 | Status: SHIPPED | OUTPATIENT
Start: 2019-05-17 | End: 2019-05-22 | Stop reason: SDUPTHER

## 2019-05-17 RX ORDER — NICOTINE 21 MG/24HR
1 PATCH, TRANSDERMAL 24 HOURS TRANSDERMAL EVERY 24 HOURS
Qty: 28 PATCH | Refills: 1 | Status: SHIPPED | OUTPATIENT
Start: 2019-05-17 | End: 2019-05-22 | Stop reason: SDUPTHER

## 2019-05-17 RX ORDER — METHYLPREDNISOLONE 4 MG/1
TABLET ORAL
Qty: 21 EACH | Refills: 0 | Status: SHIPPED | OUTPATIENT
Start: 2019-05-17 | End: 2019-05-22 | Stop reason: SDUPTHER

## 2019-05-20 NOTE — PATIENT INSTRUCTIONS
Calorie Counting for Weight Loss  Calories are units of energy. Your body needs a certain amount of calories from food to keep you going throughout the day. When you eat more calories than your body needs, your body stores the extra calories as fat. When you eat fewer calories than your body needs, your body burns fat to get the energy it needs.  Calorie counting means keeping track of how many calories you eat and drink each day. Calorie counting can be helpful if you need to lose weight. If you make sure to eat fewer calories than your body needs, you should lose weight. Ask your health care provider what a healthy weight is for you.  For calorie counting to work, you will need to eat the right number of calories in a day in order to lose a healthy amount of weight per week. A dietitian can help you determine how many calories you need in a day and will give you suggestions on how to reach your calorie goal.  · A healthy amount of weight to lose per week is usually 1-2 lb (0.5-0.9 kg). This usually means that your daily calorie intake should be reduced by 500-750 calories.  · Eating 1,200 - 1,500 calories per day can help most women lose weight.  · Eating 1,500 - 1,800 calories per day can help most men lose weight.    What is my plan?  My goal is to have __________ calories per day.  If I have this many calories per day, I should lose around __________ pounds per week.  What do I need to know about calorie counting?  In order to meet your daily calorie goal, you will need to:  · Find out how many calories are in each food you would like to eat. Try to do this before you eat.  · Decide how much of the food you plan to eat.  · Write down what you ate and how many calories it had. Doing this is called keeping a food log.    To successfully lose weight, it is important to balance calorie counting with a healthy lifestyle that includes regular activity. Aim for 150 minutes of moderate exercise (such as walking) or 75  minutes of vigorous exercise (such as running) each week.  Where do I find calorie information?    The number of calories in a food can be found on a Nutrition Facts label. If a food does not have a Nutrition Facts label, try to look up the calories online or ask your dietitian for help.  Remember that calories are listed per serving. If you choose to have more than one serving of a food, you will have to multiply the calories per serving by the amount of servings you plan to eat. For example, the label on a package of bread might say that a serving size is 1 slice and that there are 90 calories in a serving. If you eat 1 slice, you will have eaten 90 calories. If you eat 2 slices, you will have eaten 180 calories.  How do I keep a food log?  Immediately after each meal, record the following information in your food log:  · What you ate. Don't forget to include toppings, sauces, and other extras on the food.  · How much you ate. This can be measured in cups, ounces, or number of items.  · How many calories each food and drink had.  · The total number of calories in the meal.    Keep your food log near you, such as in a small notebook in your pocket, or use a mobile sally or website. Some programs will calculate calories for you and show you how many calories you have left for the day to meet your goal.  What are some calorie counting tips?  · Use your calories on foods and drinks that will fill you up and not leave you hungry:  ? Some examples of foods that fill you up are nuts and nut butters, vegetables, lean proteins, and high-fiber foods like whole grains. High-fiber foods are foods with more than 5 g fiber per serving.  ? Drinks such as sodas, specialty coffee drinks, alcohol, and juices have a lot of calories, yet do not fill you up.  · Eat nutritious foods and avoid empty calories. Empty calories are calories you get from foods or beverages that do not have many vitamins or protein, such as candy, sweets, and  "soda. It is better to have a nutritious high-calorie food (such as an avocado) than a food with few nutrients (such as a bag of chips).  · Know how many calories are in the foods you eat most often. This will help you calculate calorie counts faster.  · Pay attention to calories in drinks. Low-calorie drinks include water and unsweetened drinks.  · Pay attention to nutrition labels for \"low fat\" or \"fat free\" foods. These foods sometimes have the same amount of calories or more calories than the full fat versions. They also often have added sugar, starch, or salt, to make up for flavor that was removed with the fat.  · Find a way of tracking calories that works for you. Get creative. Try different apps or programs if writing down calories does not work for you.  What are some portion control tips?  · Know how many calories are in a serving. This will help you know how many servings of a certain food you can have.  · Use a measuring cup to measure serving sizes. You could also try weighing out portions on a kitchen scale. With time, you will be able to estimate serving sizes for some foods.  · Take some time to put servings of different foods on your favorite plates, bowls, and cups so you know what a serving looks like.  · Try not to eat straight from a bag or box. Doing this can lead to overeating. Put the amount you would like to eat in a cup or on a plate to make sure you are eating the right portion.  · Use smaller plates, glasses, and bowls to prevent overeating.  · Try not to multitask (for example, watch TV or use your computer) while eating. If it is time to eat, sit down at a table and enjoy your food. This will help you to know when you are full. It will also help you to be aware of what you are eating and how much you are eating.  What are tips for following this plan?  Reading food labels  · Check the calorie count compared to the serving size. The serving size may be smaller than what you are used to " eating.  · Check the source of the calories. Make sure the food you are eating is high in vitamins and protein and low in saturated and trans fats.  Shopping  · Read nutrition labels while you shop. This will help you make healthy decisions before you decide to purchase your food.  · Make a grocery list and stick to it.  Cooking  · Try to cook your favorite foods in a healthier way. For example, try baking instead of frying.  · Use low-fat dairy products.  Meal planning  · Use more fruits and vegetables. Half of your plate should be fruits and vegetables.  · Include lean proteins like poultry and fish.  How do I count calories when eating out?  · Ask for smaller portion sizes.  · Consider sharing an entree and sides instead of getting your own entree.  · If you get your own entree, eat only half. Ask for a box at the beginning of your meal and put the rest of your entree in it so you are not tempted to eat it.  · If calories are listed on the menu, choose the lower calorie options.  · Choose dishes that include vegetables, fruits, whole grains, low-fat dairy products, and lean protein.  · Choose items that are boiled, broiled, grilled, or steamed. Stay away from items that are buttered, battered, fried, or served with cream sauce. Items labeled “crispy” are usually fried, unless stated otherwise.  · Choose water, low-fat milk, unsweetened iced tea, or other drinks without added sugar. If you want an alcoholic beverage, choose a lower calorie option such as a glass of wine or light beer.  · Ask for dressings, sauces, and syrups on the side. These are usually high in calories, so you should limit the amount you eat.  · If you want a salad, choose a garden salad and ask for grilled meats. Avoid extra toppings like mota, cheese, or fried items. Ask for the dressing on the side, or ask for olive oil and vinegar or lemon to use as dressing.  · Estimate how many servings of a food you are given. For example, a serving of  cooked rice is ½ cup or about the size of half a baseball. Knowing serving sizes will help you be aware of how much food you are eating at restaurants. The list below tells you how big or small some common portion sizes are based on everyday objects:  ? 1 oz--4 stacked dice.  ? 3 oz--1 deck of cards.  ? 1 tsp--1 die.  ? 1 Tbsp--½ a ping-pong ball.  ? 2 Tbsp--1 ping-pong ball.  ? ½ cup--½ baseball.  ? 1 cup--1 baseball.  Summary  · Calorie counting means keeping track of how many calories you eat and drink each day. If you eat fewer calories than your body needs, you should lose weight.  · A healthy amount of weight to lose per week is usually 1-2 lb (0.5-0.9 kg). This usually means reducing your daily calorie intake by 500-750 calories.  · The number of calories in a food can be found on a Nutrition Facts label. If a food does not have a Nutrition Facts label, try to look up the calories online or ask your dietitian for help.  · Use your calories on foods and drinks that will fill you up, and not on foods and drinks that will leave you hungry.  · Use smaller plates, glasses, and bowls to prevent overeating.  This information is not intended to replace advice given to you by your health care provider. Make sure you discuss any questions you have with your health care provider.  Document Released: 12/18/2006 Document Revised: 11/17/2017 Document Reviewed: 11/17/2017  [employer] Interactive Patient Education © 2019 [employer]      Exercising to Lose Weight  Exercising can help you to lose weight. In order to lose weight through exercise, you need to do vigorous-intensity exercise. You can tell that you are exercising with vigorous intensity if you are breathing very hard and fast and cannot hold a conversation while exercising.  Moderate-intensity exercise helps to maintain your current weight. You can tell that you are exercising at a moderate level if you have a higher heart rate and faster breathing, but you are  still able to hold a conversation.  How often should I exercise?  Choose an activity that you enjoy and set realistic goals. Your health care provider can help you to make an activity plan that works for you. Exercise regularly as directed by your health care provider. This may include:  · Doing resistance training twice each week, such as:  ? Push-ups.  ? Sit-ups.  ? Lifting weights.  ? Using resistance bands.  · Doing a given intensity of exercise for a given amount of time. Choose from these options:  ? 150 minutes of moderate-intensity exercise every week.  ? 75 minutes of vigorous-intensity exercise every week.  ? A mix of moderate-intensity and vigorous-intensity exercise every week.    Children, pregnant women, people who are out of shape, people who are overweight, and older adults may need to consult a health care provider for individual recommendations. If you have any sort of medical condition, be sure to consult your health care provider before starting a new exercise program.  What are some activities that can help me to lose weight?  · Walking at a rate of at least 4.5 miles an hour.  · Jogging or running at a rate of 5 miles per hour.  · Biking at a rate of at least 10 miles per hour.  · Lap swimming.  · Roller-skating or in-line skating.  · Cross-country skiing.  · Vigorous competitive sports, such as football, basketball, and soccer.  · Jumping rope.  · Aerobic dancing.  How can I be more active in my day-to-day activities?  · Use the stairs instead of the elevator.  · Take a walk during your lunch break.  · If you drive, park your car farther away from work or school.  · If you take public transportation, get off one stop early and walk the rest of the way.  · Make all of your phone calls while standing up and walking around.  · Get up, stretch, and walk around every 30 minutes throughout the day.  What guidelines should I follow while exercising?  · Do not exercise so much that you hurt yourself,  feel dizzy, or get very short of breath.  · Consult your health care provider prior to starting a new exercise program.  · Wear comfortable clothes and shoes with good support.  · Drink plenty of water while you exercise to prevent dehydration or heat stroke. Body water is lost during exercise and must be replaced.  · Work out until you breathe faster and your heart beats faster.  This information is not intended to replace advice given to you by your health care provider. Make sure you discuss any questions you have with your health care provider.  Document Released: 01/20/2012 Document Revised: 05/25/2017 Document Reviewed: 05/21/2015  [employer] Interactive Patient Education © 2019 [employer]

## 2019-05-20 NOTE — PROGRESS NOTES
Subjective   Swapna Schaffer is a 43 y.o. female.     Here today with pain in her right shoulder for the past 2 days.  She has had no specific injury.  She also needs refills on her meds for her headaches.        Migraine    This is a chronic problem. The current episode started more than 1 year ago. The problem occurs intermittently. The problem has been waxing and waning. The pain is located in the bilateral region. Radiates to: neck. The pain quality is similar to prior headaches. The quality of the pain is described as aching, band-like and throbbing. The pain is at a severity of 10/10. The pain is severe. Associated symptoms include nausea, neck pain, phonophobia and photophobia. Pertinent negatives include no abdominal pain, abnormal behavior, anorexia, back pain, blurred vision, coughing, dizziness, drainage, ear pain, eye pain, eye redness, eye watering, facial sweating, fever, hearing loss, insomnia, loss of balance, muscle aches, numbness, scalp tenderness, seizures, sinus pressure, sore throat, swollen glands, tingling, tinnitus, visual change, vomiting, weakness or weight loss. The symptoms are aggravated by bright light, fatigue and emotional stress (rom). She has tried NSAIDs for the symptoms. The treatment provided significant relief.   Upper Extremity Issue   This is a new (right shoulder pain) problem. The current episode started in the past 7 days. The problem occurs constantly. The problem has been unchanged. Associated symptoms include arthralgias (left shoulder), nausea and neck pain. Pertinent negatives include no abdominal pain, anorexia, coughing, fever, numbness, sore throat, swollen glands, visual change, vomiting or weakness. Exacerbated by: rom. She has tried nothing for the symptoms. The treatment provided no relief.        The following portions of the patient's history were reviewed and updated as appropriate: allergies, current medications, past family history, past medical  history, past social history, past surgical history and problem list.    Review of Systems   Constitutional: Negative.  Negative for fever and unexpected weight loss.   HENT: Negative.  Negative for ear pain, hearing loss, sinus pressure, sore throat and tinnitus.    Eyes: Positive for photophobia. Negative for blurred vision, pain and redness.   Respiratory: Negative.  Negative for cough.    Cardiovascular: Negative.    Gastrointestinal: Positive for nausea. Negative for abdominal pain, anorexia and vomiting.   Endocrine: Negative.    Genitourinary: Negative.    Musculoskeletal: Positive for arthralgias (left shoulder) and neck pain. Negative for back pain.   Skin: Negative.    Allergic/Immunologic: Negative.    Neurological: Positive for headache. Negative for dizziness, tingling, seizures, weakness, numbness and loss of balance.   Hematological: Negative.    Psychiatric/Behavioral: Negative.  The patient does not have insomnia.        Objective   Physical Exam   Constitutional: She is oriented to person, place, and time. She appears well-developed and well-nourished. No distress.   HENT:   Head: Normocephalic and atraumatic.   Right Ear: External ear normal.   Left Ear: External ear normal.   Nose: Nose normal.   Mouth/Throat: Oropharynx is clear and moist. No oropharyngeal exudate.   Eyes: Pupils are equal, round, and reactive to light.   Neck: Normal range of motion. Neck supple. No thyromegaly present.   Cardiovascular: Normal rate, regular rhythm and normal heart sounds. Exam reveals no friction rub.   No murmur heard.  Pulmonary/Chest: Effort normal and breath sounds normal. No respiratory distress. She has no wheezes. She has no rales.   Abdominal: Soft.   Musculoskeletal: Normal range of motion.        Right shoulder: She exhibits tenderness and pain. She exhibits normal range of motion.   X ray of right shoulder is reviewed and is normal.   Neurological: She is alert and oriented to person, place, and  time.   Skin: Skin is warm and dry.   Psychiatric: She has a normal mood and affect. Thought content normal.   Nursing note and vitals reviewed.        Assessment/Plan   Swapna was seen today for migraine.    Diagnoses and all orders for this visit:    Acute pain of right shoulder  -     XR Shoulder 2+ View Right (In Office)  -     methylPREDNISolone (MEDROL, JULIO,) 4 MG tablet; Take as directed on package instructions.    Nonintractable headache, unspecified chronicity pattern, unspecified headache type  -     ibuprofen (ADVIL,MOTRIN) 600 MG tablet; Take 1 tablet by mouth Every 8 (Eight) Hours As Needed for Moderate Pain .    Seasonal allergic rhinitis  -     diphenhydrAMINE (BENADRYL) 25 mg capsule; Take 1 capsule by mouth Every 6 (Six) Hours As Needed for Itching.    Tobacco abuse  -     nicotine (NICODERM CQ) 21 MG/24HR patch; Place 1 patch on the skin as directed by provider Daily.      Will consider pt if she cont to have the right shoulder pain

## 2019-05-22 DIAGNOSIS — J30.2 SEASONAL ALLERGIC RHINITIS: ICD-10-CM

## 2019-05-22 DIAGNOSIS — M25.511 ACUTE PAIN OF RIGHT SHOULDER: ICD-10-CM

## 2019-05-22 DIAGNOSIS — Z72.0 TOBACCO ABUSE: ICD-10-CM

## 2019-05-22 DIAGNOSIS — R51.9 NONINTRACTABLE HEADACHE, UNSPECIFIED CHRONICITY PATTERN, UNSPECIFIED HEADACHE TYPE: ICD-10-CM

## 2019-05-22 RX ORDER — FLUTICASONE PROPIONATE 50 MCG
2 SPRAY, SUSPENSION (ML) NASAL DAILY
Qty: 18.2 ML | Refills: 3 | Status: SHIPPED | OUTPATIENT
Start: 2019-05-22 | End: 2021-08-09 | Stop reason: SDUPTHER

## 2019-05-22 RX ORDER — NICOTINE 21 MG/24HR
1 PATCH, TRANSDERMAL 24 HOURS TRANSDERMAL EVERY 24 HOURS
Qty: 28 PATCH | Refills: 1 | Status: SHIPPED | OUTPATIENT
Start: 2019-05-22 | End: 2021-06-25

## 2019-05-22 RX ORDER — IBUPROFEN 600 MG/1
600 TABLET ORAL EVERY 8 HOURS PRN
Qty: 90 TABLET | Refills: 2 | Status: SHIPPED | OUTPATIENT
Start: 2019-05-22 | End: 2019-09-23 | Stop reason: SDUPTHER

## 2019-05-22 RX ORDER — METHYLPREDNISOLONE 4 MG/1
TABLET ORAL
Qty: 21 EACH | Refills: 0 | Status: SHIPPED | OUTPATIENT
Start: 2019-05-22 | End: 2021-06-25

## 2019-05-22 RX ORDER — DIPHENHYDRAMINE HCL 25 MG
25 CAPSULE ORAL EVERY 6 HOURS PRN
Qty: 120 CAPSULE | Refills: 5 | Status: SHIPPED | OUTPATIENT
Start: 2019-05-22 | End: 2020-05-06

## 2019-05-23 ENCOUNTER — CLINICAL SUPPORT (OUTPATIENT)
Dept: FAMILY MEDICINE CLINIC | Facility: CLINIC | Age: 44
End: 2019-05-23

## 2019-05-23 DIAGNOSIS — M25.511 ACUTE PAIN OF RIGHT SHOULDER: Primary | ICD-10-CM

## 2019-05-23 PROCEDURE — 96372 THER/PROPH/DIAG INJ SC/IM: CPT | Performed by: NURSE PRACTITIONER

## 2019-05-23 RX ORDER — BETAMETHASONE SODIUM PHOSPHATE AND BETAMETHASONE ACETATE 3; 3 MG/ML; MG/ML
12 INJECTION, SUSPENSION INTRA-ARTICULAR; INTRALESIONAL; INTRAMUSCULAR; SOFT TISSUE ONCE
Status: COMPLETED | OUTPATIENT
Start: 2019-05-23 | End: 2019-05-23

## 2019-05-23 RX ADMIN — BETAMETHASONE SODIUM PHOSPHATE AND BETAMETHASONE ACETATE 12 MG: 3; 3 INJECTION, SUSPENSION INTRA-ARTICULAR; INTRALESIONAL; INTRAMUSCULAR; SOFT TISSUE at 16:01

## 2019-05-31 ENCOUNTER — TELEPHONE (OUTPATIENT)
Dept: FAMILY MEDICINE CLINIC | Facility: CLINIC | Age: 44
End: 2019-05-31

## 2019-07-30 RX ORDER — IBUPROFEN 800 MG/1
800 TABLET ORAL EVERY 6 HOURS PRN
Qty: 30 TABLET | Refills: 3 | Status: SHIPPED | OUTPATIENT
Start: 2019-07-30 | End: 2019-08-27 | Stop reason: SDUPTHER

## 2019-08-27 ENCOUNTER — OFFICE VISIT (OUTPATIENT)
Dept: FAMILY MEDICINE CLINIC | Facility: CLINIC | Age: 44
End: 2019-08-27

## 2019-08-27 VITALS
RESPIRATION RATE: 20 BRPM | DIASTOLIC BLOOD PRESSURE: 102 MMHG | HEART RATE: 111 BPM | OXYGEN SATURATION: 99 % | TEMPERATURE: 97.8 F | SYSTOLIC BLOOD PRESSURE: 148 MMHG | BODY MASS INDEX: 29.66 KG/M2 | HEIGHT: 65 IN | WEIGHT: 178 LBS

## 2019-08-27 DIAGNOSIS — R63.4 WEIGHT LOSS: ICD-10-CM

## 2019-08-27 DIAGNOSIS — M54.2 NECK PAIN: ICD-10-CM

## 2019-08-27 DIAGNOSIS — N91.2 AMENORRHEA: Primary | ICD-10-CM

## 2019-08-27 DIAGNOSIS — I10 ESSENTIAL HYPERTENSION: ICD-10-CM

## 2019-08-27 PROCEDURE — 99214 OFFICE O/P EST MOD 30 MIN: CPT | Performed by: NURSE PRACTITIONER

## 2019-08-27 RX ORDER — CYPROHEPTADINE HYDROCHLORIDE 4 MG/1
4 TABLET ORAL 2 TIMES DAILY PRN
Qty: 60 TABLET | Refills: 5 | Status: SHIPPED | OUTPATIENT
Start: 2019-08-27 | End: 2020-05-06

## 2019-08-27 RX ORDER — IBUPROFEN 800 MG/1
800 TABLET ORAL EVERY 6 HOURS PRN
Qty: 30 TABLET | Refills: 3 | Status: SHIPPED | OUTPATIENT
Start: 2019-08-27 | End: 2021-06-25

## 2019-08-29 NOTE — PROGRESS NOTES
Subjective   Swapna Schaffer is a 43 y.o. female.     Here today stating that she has not had a period in a few months.  She says her mother went through menopause in her late 30s and her sister is in her early 40s and is going through menopause.  Also she needs some refills on some of her meds.      Menstrual Problem   This is a new problem. The current episode started more than 1 month ago. The problem occurs constantly. The problem has been unchanged. Associated symptoms include neck pain. Pertinent negatives include no abdominal pain, anorexia, arthralgias, change in bowel habit, chest pain, chills, congestion, coughing, diaphoresis, fatigue, fever, headaches, joint swelling, myalgias, nausea, numbness, rash, sore throat, swollen glands, urinary symptoms, vertigo, visual change, vomiting or weakness. Nothing aggravates the symptoms. She has tried nothing for the symptoms. The treatment provided no relief.        The following portions of the patient's history were reviewed and updated as appropriate: allergies, current medications, past family history, past medical history, past social history, past surgical history and problem list.    Review of Systems   Constitutional: Negative.  Negative for chills, diaphoresis, fatigue and fever.   HENT: Negative.  Negative for congestion, sore throat and swollen glands.    Eyes: Negative.    Respiratory: Negative.  Negative for cough.    Cardiovascular: Negative.  Negative for chest pain.   Gastrointestinal: Negative.  Negative for abdominal pain, anorexia, change in bowel habit, nausea and vomiting.   Endocrine: Negative.    Genitourinary: Positive for menstrual problem.   Musculoskeletal: Positive for neck pain. Negative for arthralgias, joint swelling and myalgias.   Skin: Negative.  Negative for rash.   Allergic/Immunologic: Negative.    Neurological: Negative.  Negative for vertigo, weakness and numbness.   Hematological: Negative.    Psychiatric/Behavioral:  Negative.        Objective   Physical Exam   Constitutional: She is oriented to person, place, and time. She appears well-developed and well-nourished. No distress.   HENT:   Head: Normocephalic and atraumatic.   Right Ear: External ear normal.   Left Ear: External ear normal.   Nose: Nose normal.   Mouth/Throat: Oropharynx is clear and moist. No oropharyngeal exudate.   Eyes: Pupils are equal, round, and reactive to light.   Neck: Normal range of motion. Neck supple. No thyromegaly present.   Cardiovascular: Normal rate, regular rhythm and normal heart sounds. Exam reveals no friction rub.   No murmur heard.  Pulmonary/Chest: Effort normal and breath sounds normal. No respiratory distress. She has no wheezes. She has no rales.   Abdominal: Soft.   Musculoskeletal: Normal range of motion.   Neurological: She is alert and oriented to person, place, and time.   Skin: Skin is warm and dry.   Psychiatric: She has a normal mood and affect. Thought content normal.   Nursing note and vitals reviewed.        Assessment/Plan   Swapna was seen today for menstrual problem.    Diagnoses and all orders for this visit:    Amenorrhea  Comments:  probably due to early menopause.    Weight loss  -     cyproheptadine (PERIACTIN) 4 MG tablet; Take 1 tablet by mouth 2 (Two) Times a Day As Needed for Allergies.    Essential hypertension    Neck pain  -     ibuprofen (ADVIL,MOTRIN) 800 MG tablet; Take 1 tablet by mouth Every 6 (Six) Hours As Needed for Mild Pain .      Will just watch sx for now.  She is up to date on her pap.

## 2019-09-11 ENCOUNTER — TELEPHONE (OUTPATIENT)
Dept: FAMILY MEDICINE CLINIC | Facility: CLINIC | Age: 44
End: 2019-09-11

## 2019-09-12 DIAGNOSIS — Z12.39 SCREENING FOR BREAST CANCER: Primary | ICD-10-CM

## 2019-09-23 ENCOUNTER — TELEPHONE (OUTPATIENT)
Dept: FAMILY MEDICINE CLINIC | Facility: CLINIC | Age: 44
End: 2019-09-23

## 2019-09-23 DIAGNOSIS — I10 ESSENTIAL HYPERTENSION: ICD-10-CM

## 2019-09-23 DIAGNOSIS — R51.9 NONINTRACTABLE HEADACHE, UNSPECIFIED CHRONICITY PATTERN, UNSPECIFIED HEADACHE TYPE: ICD-10-CM

## 2019-09-23 RX ORDER — LOSARTAN POTASSIUM 50 MG/1
50 TABLET ORAL DAILY
Qty: 30 TABLET | Refills: 3 | Status: SHIPPED | OUTPATIENT
Start: 2019-09-23 | End: 2020-01-29

## 2019-09-23 RX ORDER — IBUPROFEN 600 MG/1
600 TABLET ORAL EVERY 8 HOURS PRN
Qty: 90 TABLET | Refills: 3 | Status: SHIPPED | OUTPATIENT
Start: 2019-09-23 | End: 2020-08-12

## 2019-10-15 ENCOUNTER — PROCEDURE VISIT (OUTPATIENT)
Dept: FAMILY MEDICINE CLINIC | Facility: CLINIC | Age: 44
End: 2019-10-15

## 2019-10-15 VITALS
HEART RATE: 95 BPM | WEIGHT: 176 LBS | SYSTOLIC BLOOD PRESSURE: 120 MMHG | DIASTOLIC BLOOD PRESSURE: 89 MMHG | OXYGEN SATURATION: 98 % | HEIGHT: 65 IN | BODY MASS INDEX: 29.32 KG/M2 | RESPIRATION RATE: 18 BRPM | TEMPERATURE: 97.8 F

## 2019-10-15 DIAGNOSIS — R51.9 NONINTRACTABLE HEADACHE, UNSPECIFIED CHRONICITY PATTERN, UNSPECIFIED HEADACHE TYPE: Primary | ICD-10-CM

## 2019-10-15 PROCEDURE — 99213 OFFICE O/P EST LOW 20 MIN: CPT | Performed by: NURSE PRACTITIONER

## 2019-10-15 RX ORDER — SUMATRIPTAN 50 MG/1
TABLET, FILM COATED ORAL
Qty: 9 TABLET | Refills: 5 | Status: SHIPPED | OUTPATIENT
Start: 2019-10-15 | End: 2021-02-23

## 2019-10-15 RX ORDER — TOPIRAMATE 50 MG/1
50 CAPSULE, EXTENDED RELEASE ORAL
COMMUNITY
End: 2021-06-25

## 2019-10-17 NOTE — PROGRESS NOTES
Subjective   Swapna Schaffer is a 44 y.o. female.     Here today with recurrent headaches.  She says it started again about 4 days ago and she has had a migraine quality headache ever since.  She has had them before.  They usually wake her up and is located on the right side of her head.  She says they tend to wake her up.      Headache    This is a recurrent problem. The current episode started in the past 7 days. The problem occurs constantly. The problem has been unchanged. The pain is located in the right unilateral and frontal region. The pain does not radiate. The pain quality is similar to prior headaches. The quality of the pain is described as band-like and throbbing. The pain is at a severity of 5/10. The pain is moderate. Associated symptoms include nausea, phonophobia and photophobia. Pertinent negatives include no abdominal pain, abnormal behavior, anorexia, back pain, blurred vision, coughing, dizziness, drainage, ear pain, eye pain, eye redness, eye watering, facial sweating, fever, hearing loss, insomnia, loss of balance, muscle aches, neck pain, numbness, rhinorrhea, scalp tenderness, seizures, sinus pressure, sore throat, swollen glands, tingling, tinnitus, visual change, vomiting, weakness or weight loss. She has tried NSAIDs for the symptoms. The treatment provided mild relief. Her past medical history is significant for migraine headaches.        The following portions of the patient's history were reviewed and updated as appropriate: allergies, current medications, past family history, past medical history, past social history, past surgical history and problem list.    Review of Systems   Constitutional: Negative.  Negative for fever and unexpected weight loss.   HENT: Negative for ear pain, hearing loss, rhinorrhea, sinus pressure, sore throat, swollen glands and tinnitus.    Eyes: Positive for photophobia. Negative for blurred vision, pain and redness.   Respiratory: Negative.   Negative for cough.    Cardiovascular: Negative.    Gastrointestinal: Positive for nausea. Negative for abdominal pain, anorexia and vomiting.   Endocrine: Negative.    Genitourinary: Negative.    Musculoskeletal: Negative.  Negative for back pain and neck pain.   Skin: Negative.    Allergic/Immunologic: Negative.    Neurological: Positive for headache. Negative for dizziness, tingling, seizures, weakness, numbness and loss of balance.   Hematological: Negative.    Psychiatric/Behavioral: Negative.  The patient does not have insomnia.        Objective   Physical Exam   Constitutional: She is oriented to person, place, and time. She appears well-developed and well-nourished. No distress.   HENT:   Head: Normocephalic and atraumatic.   Right Ear: External ear normal.   Left Ear: External ear normal.   Nose: Nose normal.   Mouth/Throat: Oropharynx is clear and moist. No oropharyngeal exudate.   Eyes: EOM are normal. Pupils are equal, round, and reactive to light.   Neck: Normal range of motion. Neck supple. No thyromegaly present.   Cardiovascular: Normal rate, regular rhythm and normal heart sounds. Exam reveals no friction rub.   No murmur heard.  Pulmonary/Chest: Effort normal and breath sounds normal. No respiratory distress. She has no wheezes. She has no rales.   Abdominal: Soft.   Musculoskeletal: Normal range of motion.   Neurological: She is alert and oriented to person, place, and time.   Skin: Skin is warm and dry.   Psychiatric: She has a normal mood and affect. Thought content normal.   Nursing note and vitals reviewed.        Assessment/Plan   Swapna was seen today for headache.    Diagnoses and all orders for this visit:    Nonintractable headache, unspecified chronicity pattern, unspecified headache type  Comments:  have also added trokendi 50mg 1 q day samples.    Orders:  -     SUMAtriptan (IMITREX) 50 MG tablet; Take one tablet at onset of headache. May repeat dose one time in 2 hours if headache  not relieved.    BMI 29.0-29.9,adult  Comments:  diet and exercise info given

## 2019-10-17 NOTE — PATIENT INSTRUCTIONS
Calorie Counting for Weight Loss  Calories are units of energy. Your body needs a certain amount of calories from food to keep you going throughout the day. When you eat more calories than your body needs, your body stores the extra calories as fat. When you eat fewer calories than your body needs, your body burns fat to get the energy it needs.  Calorie counting means keeping track of how many calories you eat and drink each day. Calorie counting can be helpful if you need to lose weight. If you make sure to eat fewer calories than your body needs, you should lose weight. Ask your health care provider what a healthy weight is for you.  For calorie counting to work, you will need to eat the right number of calories in a day in order to lose a healthy amount of weight per week. A dietitian can help you determine how many calories you need in a day and will give you suggestions on how to reach your calorie goal.  · A healthy amount of weight to lose per week is usually 1-2 lb (0.5-0.9 kg). This usually means that your daily calorie intake should be reduced by 500-750 calories.  · Eating 1,200 - 1,500 calories per day can help most women lose weight.  · Eating 1,500 - 1,800 calories per day can help most men lose weight.  What is my plan?  My goal is to have __________ calories per day.  If I have this many calories per day, I should lose around __________ pounds per week.  What do I need to know about calorie counting?  In order to meet your daily calorie goal, you will need to:  · Find out how many calories are in each food you would like to eat. Try to do this before you eat.  · Decide how much of the food you plan to eat.  · Write down what you ate and how many calories it had. Doing this is called keeping a food log.  To successfully lose weight, it is important to balance calorie counting with a healthy lifestyle that includes regular activity. Aim for 150 minutes of moderate exercise (such as walking) or 75  minutes of vigorous exercise (such as running) each week.  Where do I find calorie information?    The number of calories in a food can be found on a Nutrition Facts label. If a food does not have a Nutrition Facts label, try to look up the calories online or ask your dietitian for help.  Remember that calories are listed per serving. If you choose to have more than one serving of a food, you will have to multiply the calories per serving by the amount of servings you plan to eat. For example, the label on a package of bread might say that a serving size is 1 slice and that there are 90 calories in a serving. If you eat 1 slice, you will have eaten 90 calories. If you eat 2 slices, you will have eaten 180 calories.  How do I keep a food log?  Immediately after each meal, record the following information in your food log:  · What you ate. Don't forget to include toppings, sauces, and other extras on the food.  · How much you ate. This can be measured in cups, ounces, or number of items.  · How many calories each food and drink had.  · The total number of calories in the meal.  Keep your food log near you, such as in a small notebook in your pocket, or use a mobile sally or website. Some programs will calculate calories for you and show you how many calories you have left for the day to meet your goal.  What are some calorie counting tips?    · Use your calories on foods and drinks that will fill you up and not leave you hungry:  ? Some examples of foods that fill you up are nuts and nut butters, vegetables, lean proteins, and high-fiber foods like whole grains. High-fiber foods are foods with more than 5 g fiber per serving.  ? Drinks such as sodas, specialty coffee drinks, alcohol, and juices have a lot of calories, yet do not fill you up.  · Eat nutritious foods and avoid empty calories. Empty calories are calories you get from foods or beverages that do not have many vitamins or protein, such as candy, sweets, and  "soda. It is better to have a nutritious high-calorie food (such as an avocado) than a food with few nutrients (such as a bag of chips).  · Know how many calories are in the foods you eat most often. This will help you calculate calorie counts faster.  · Pay attention to calories in drinks. Low-calorie drinks include water and unsweetened drinks.  · Pay attention to nutrition labels for \"low fat\" or \"fat free\" foods. These foods sometimes have the same amount of calories or more calories than the full fat versions. They also often have added sugar, starch, or salt, to make up for flavor that was removed with the fat.  · Find a way of tracking calories that works for you. Get creative. Try different apps or programs if writing down calories does not work for you.  What are some portion control tips?  · Know how many calories are in a serving. This will help you know how many servings of a certain food you can have.  · Use a measuring cup to measure serving sizes. You could also try weighing out portions on a kitchen scale. With time, you will be able to estimate serving sizes for some foods.  · Take some time to put servings of different foods on your favorite plates, bowls, and cups so you know what a serving looks like.  · Try not to eat straight from a bag or box. Doing this can lead to overeating. Put the amount you would like to eat in a cup or on a plate to make sure you are eating the right portion.  · Use smaller plates, glasses, and bowls to prevent overeating.  · Try not to multitask (for example, watch TV or use your computer) while eating. If it is time to eat, sit down at a table and enjoy your food. This will help you to know when you are full. It will also help you to be aware of what you are eating and how much you are eating.  What are tips for following this plan?  Reading food labels  · Check the calorie count compared to the serving size. The serving size may be smaller than what you are used to " eating.  · Check the source of the calories. Make sure the food you are eating is high in vitamins and protein and low in saturated and trans fats.  Shopping  · Read nutrition labels while you shop. This will help you make healthy decisions before you decide to purchase your food.  · Make a grocery list and stick to it.  Cooking  · Try to cook your favorite foods in a healthier way. For example, try baking instead of frying.  · Use low-fat dairy products.  Meal planning  · Use more fruits and vegetables. Half of your plate should be fruits and vegetables.  · Include lean proteins like poultry and fish.  How do I count calories when eating out?  · Ask for smaller portion sizes.  · Consider sharing an entree and sides instead of getting your own entree.  · If you get your own entree, eat only half. Ask for a box at the beginning of your meal and put the rest of your entree in it so you are not tempted to eat it.  · If calories are listed on the menu, choose the lower calorie options.  · Choose dishes that include vegetables, fruits, whole grains, low-fat dairy products, and lean protein.  · Choose items that are boiled, broiled, grilled, or steamed. Stay away from items that are buttered, battered, fried, or served with cream sauce. Items labeled “crispy” are usually fried, unless stated otherwise.  · Choose water, low-fat milk, unsweetened iced tea, or other drinks without added sugar. If you want an alcoholic beverage, choose a lower calorie option such as a glass of wine or light beer.  · Ask for dressings, sauces, and syrups on the side. These are usually high in calories, so you should limit the amount you eat.  · If you want a salad, choose a garden salad and ask for grilled meats. Avoid extra toppings like mota, cheese, or fried items. Ask for the dressing on the side, or ask for olive oil and vinegar or lemon to use as dressing.  · Estimate how many servings of a food you are given. For example, a serving of  cooked rice is ½ cup or about the size of half a baseball. Knowing serving sizes will help you be aware of how much food you are eating at restaurants. The list below tells you how big or small some common portion sizes are based on everyday objects:  ? 1 oz--4 stacked dice.  ? 3 oz--1 deck of cards.  ? 1 tsp--1 die.  ? 1 Tbsp--½ a ping-pong ball.  ? 2 Tbsp--1 ping-pong ball.  ? ½ cup--½ baseball.  ? 1 cup--1 baseball.  Summary  · Calorie counting means keeping track of how many calories you eat and drink each day. If you eat fewer calories than your body needs, you should lose weight.  · A healthy amount of weight to lose per week is usually 1-2 lb (0.5-0.9 kg). This usually means reducing your daily calorie intake by 500-750 calories.  · The number of calories in a food can be found on a Nutrition Facts label. If a food does not have a Nutrition Facts label, try to look up the calories online or ask your dietitian for help.  · Use your calories on foods and drinks that will fill you up, and not on foods and drinks that will leave you hungry.  · Use smaller plates, glasses, and bowls to prevent overeating.  This information is not intended to replace advice given to you by your health care provider. Make sure you discuss any questions you have with your health care provider.  Document Released: 12/18/2006 Document Revised: 11/17/2017 Document Reviewed: 11/17/2017  [employer] Interactive Patient Education © 2019 [employer]      Exercising to Lose Weight  Exercise is structured, repetitive physical activity to improve fitness and health. Getting regular exercise is important for everyone. It is especially important if you are overweight. Being overweight increases your risk of heart disease, stroke, diabetes, high blood pressure, and several types of cancer. Reducing your calorie intake and exercising can help you lose weight.  Exercise is usually categorized as moderate or vigorous intensity. To lose weight, most  people need to do a certain amount of moderate-intensity or vigorous-intensity exercise each week.  Moderate-intensity exercise    Moderate-intensity exercise is any activity that gets you moving enough to burn at least three times more energy (calories) than if you were sitting.  Examples of moderate exercise include:  · Walking a mile in 15 minutes.  · Doing light yard work.  · Biking at an easy pace.  Most people should get at least 150 minutes (2 hours and 30 minutes) a week of moderate-intensity exercise to maintain their body weight.  Vigorous-intensity exercise  Vigorous-intensity exercise is any activity that gets you moving enough to burn at least six times more calories than if you were sitting. When you exercise at this intensity, you should be working hard enough that you are not able to carry on a conversation.  Examples of vigorous exercise include:  · Running.  · Playing a team sport, such as football, basketball, and soccer.  · Jumping rope.  Most people should get at least 75 minutes (1 hour and 15 minutes) a week of vigorous-intensity exercise to maintain their body weight.  How can exercise affect me?  When you exercise enough to burn more calories than you eat, you lose weight. Exercise also reduces body fat and builds muscle. The more muscle you have, the more calories you burn. Exercise also:  · Improves mood.  · Reduces stress and tension.  · Improves your overall fitness, flexibility, and endurance.  · Increases bone strength.  The amount of exercise you need to lose weight depends on:  · Your age.  · The type of exercise.  · Any health conditions you have.  · Your overall physical ability.  Talk to your health care provider about how much exercise you need and what types of activities are safe for you.  What actions can I take to lose weight?  Nutrition    · Make changes to your diet as told by your health care provider or diet and nutrition specialist (dietitian). This may  include:  ? Eating fewer calories.  ? Eating more protein.  ? Eating less unhealthy fats.  ? Eating a diet that includes fresh fruits and vegetables, whole grains, low-fat dairy products, and lean protein.  ? Avoiding foods with added fat, salt, and sugar.  · Drink plenty of water while you exercise to prevent dehydration or heat stroke.  Activity  · Choose an activity that you enjoy and set realistic goals. Your health care provider can help you make an exercise plan that works for you.  · Exercise at a moderate or vigorous intensity most days of the week.  ? The intensity of exercise may vary from person to person. You can tell how intense a workout is for you by paying attention to your breathing and heartbeat. Most people will notice their breathing and heartbeat get faster with more intense exercise.  · Do resistance training twice each week, such as:  ? Push-ups.  ? Sit-ups.  ? Lifting weights.  ? Using resistance bands.  · Getting short amounts of exercise can be just as helpful as long structured periods of exercise. If you have trouble finding time to exercise, try to include exercise in your daily routine.  ? Get up, stretch, and walk around every 30 minutes throughout the day.  ? Go for a walk during your lunch break.  ? Park your car farther away from your destination.  ? If you take public transportation, get off one stop early and walk the rest of the way.  ? Make phone calls while standing up and walking around.  ? Take the stairs instead of elevators or escalators.  · Wear comfortable clothes and shoes with good support.  · Do not exercise so much that you hurt yourself, feel dizzy, or get very short of breath.  Where to find more information  · [place] Department of Health and Human Services: www.hhs.gov  · Centers for Disease Control and Prevention (CDC): www.cdc.gov  Contact a health care provider:  · Before starting a new exercise program.  · If you have questions or concerns about your  weight.  · If you have a medical problem that keeps you from exercising.  Get help right away if you have any of the following while exercising:  · Injury.  · Dizziness.  · Difficulty breathing or shortness of breath that does not go away when you stop exercising.  · Chest pain.  · Rapid heartbeat.  Summary  · Being overweight increases your risk of heart disease, stroke, diabetes, high blood pressure, and several types of cancer.  · Losing weight happens when you burn more calories than you eat.  · Reducing the amount of calories you eat in addition to getting regular moderate or vigorous exercise each week helps you lose weight.  This information is not intended to replace advice given to you by your health care provider. Make sure you discuss any questions you have with your health care provider.  Document Released: 01/20/2012 Document Revised: 12/31/2018 Document Reviewed: 12/31/2018  [employer] Interactive Patient Education © 2019 [employer]

## 2019-11-02 DIAGNOSIS — M54.2 NECK PAIN: ICD-10-CM

## 2019-11-04 RX ORDER — CYCLOBENZAPRINE HCL 5 MG
5 TABLET ORAL 2 TIMES DAILY PRN
Qty: 30 TABLET | Refills: 3 | Status: SHIPPED | OUTPATIENT
Start: 2019-11-04 | End: 2020-02-25

## 2019-11-19 DIAGNOSIS — Z12.39 SCREENING FOR BREAST CANCER: ICD-10-CM

## 2020-01-29 DIAGNOSIS — I10 ESSENTIAL HYPERTENSION: ICD-10-CM

## 2020-01-29 RX ORDER — LOSARTAN POTASSIUM 50 MG/1
50 TABLET ORAL DAILY
Qty: 30 TABLET | Refills: 3 | Status: SHIPPED | OUTPATIENT
Start: 2020-01-29 | End: 2021-02-23 | Stop reason: SDUPTHER

## 2020-02-25 DIAGNOSIS — M54.2 NECK PAIN: ICD-10-CM

## 2020-02-25 RX ORDER — CYCLOBENZAPRINE HCL 5 MG
5 TABLET ORAL 2 TIMES DAILY PRN
Qty: 30 TABLET | Refills: 2 | Status: SHIPPED | OUTPATIENT
Start: 2020-02-25 | End: 2020-06-18

## 2020-05-06 DIAGNOSIS — J30.2 SEASONAL ALLERGIC RHINITIS: ICD-10-CM

## 2020-05-06 DIAGNOSIS — R63.4 WEIGHT LOSS: ICD-10-CM

## 2020-05-06 RX ORDER — DIPHENHYDRAMINE HYDROCHLORIDE 25 MG/1
CAPSULE ORAL
Qty: 120 CAPSULE | Refills: 4 | Status: SHIPPED | OUTPATIENT
Start: 2020-05-06 | End: 2021-02-23 | Stop reason: SDUPTHER

## 2020-05-06 RX ORDER — CYPROHEPTADINE HYDROCHLORIDE 4 MG/1
TABLET ORAL
Qty: 60 TABLET | Refills: 4 | Status: SHIPPED | OUTPATIENT
Start: 2020-05-06 | End: 2021-01-18

## 2020-06-18 DIAGNOSIS — M54.2 NECK PAIN: ICD-10-CM

## 2020-06-18 RX ORDER — CYCLOBENZAPRINE HCL 5 MG
5 TABLET ORAL 2 TIMES DAILY PRN
Qty: 30 TABLET | Refills: 2 | Status: SHIPPED | OUTPATIENT
Start: 2020-06-18 | End: 2021-01-18

## 2020-07-06 RX ORDER — LOSARTAN POTASSIUM 100 MG/1
TABLET ORAL
Qty: 15 TABLET | Refills: 3 | Status: SHIPPED | OUTPATIENT
Start: 2020-07-06 | End: 2021-02-12 | Stop reason: SDUPTHER

## 2020-08-12 DIAGNOSIS — R51.9 NONINTRACTABLE HEADACHE, UNSPECIFIED CHRONICITY PATTERN, UNSPECIFIED HEADACHE TYPE: ICD-10-CM

## 2020-08-12 RX ORDER — IBUPROFEN 600 MG/1
TABLET ORAL
Qty: 90 TABLET | Refills: 1 | Status: SHIPPED | OUTPATIENT
Start: 2020-08-12 | End: 2021-01-18

## 2020-10-25 DIAGNOSIS — M54.2 NECK PAIN: ICD-10-CM

## 2020-10-26 RX ORDER — CYCLOBENZAPRINE HCL 5 MG
TABLET ORAL
Qty: 30 TABLET | Refills: 2 | OUTPATIENT
Start: 2020-10-26

## 2020-11-09 DIAGNOSIS — R51.9 NONINTRACTABLE HEADACHE, UNSPECIFIED CHRONICITY PATTERN, UNSPECIFIED HEADACHE TYPE: ICD-10-CM

## 2020-11-09 RX ORDER — IBUPROFEN 600 MG/1
TABLET ORAL
Qty: 90 TABLET | Refills: 1 | OUTPATIENT
Start: 2020-11-09

## 2020-12-30 DIAGNOSIS — J30.2 SEASONAL ALLERGIC RHINITIS: ICD-10-CM

## 2020-12-30 DIAGNOSIS — R63.4 WEIGHT LOSS: ICD-10-CM

## 2020-12-30 RX ORDER — CYPROHEPTADINE HYDROCHLORIDE 4 MG/1
TABLET ORAL
Qty: 60 TABLET | Refills: 4 | OUTPATIENT
Start: 2020-12-30

## 2020-12-30 RX ORDER — DIPHENHYDRAMINE HYDROCHLORIDE 25 MG/1
CAPSULE ORAL
Qty: 120 CAPSULE | Refills: 4 | OUTPATIENT
Start: 2020-12-30

## 2021-01-18 DIAGNOSIS — R63.4 WEIGHT LOSS: ICD-10-CM

## 2021-01-18 DIAGNOSIS — M54.2 NECK PAIN: ICD-10-CM

## 2021-01-18 DIAGNOSIS — R51.9 NONINTRACTABLE HEADACHE, UNSPECIFIED CHRONICITY PATTERN, UNSPECIFIED HEADACHE TYPE: ICD-10-CM

## 2021-01-18 RX ORDER — CYPROHEPTADINE HYDROCHLORIDE 4 MG/1
TABLET ORAL
Qty: 60 TABLET | Refills: 4 | Status: SHIPPED | OUTPATIENT
Start: 2021-01-18 | End: 2021-12-13

## 2021-01-18 RX ORDER — IBUPROFEN 600 MG/1
TABLET ORAL
Qty: 90 TABLET | Refills: 1 | Status: SHIPPED | OUTPATIENT
Start: 2021-01-18 | End: 2021-03-23 | Stop reason: SDUPTHER

## 2021-01-18 RX ORDER — CYCLOBENZAPRINE HCL 5 MG
TABLET ORAL
Qty: 30 TABLET | Refills: 2 | Status: SHIPPED | OUTPATIENT
Start: 2021-01-18 | End: 2021-02-23 | Stop reason: SDUPTHER

## 2021-02-12 RX ORDER — LOSARTAN POTASSIUM 100 MG/1
TABLET ORAL
Qty: 15 TABLET | Refills: 3 | Status: SHIPPED | OUTPATIENT
Start: 2021-02-12 | End: 2021-02-23

## 2021-02-23 ENCOUNTER — OFFICE VISIT (OUTPATIENT)
Dept: FAMILY MEDICINE CLINIC | Facility: CLINIC | Age: 46
End: 2021-02-23

## 2021-02-23 ENCOUNTER — TELEPHONE (OUTPATIENT)
Dept: FAMILY MEDICINE CLINIC | Facility: CLINIC | Age: 46
End: 2021-02-23

## 2021-02-23 VITALS
OXYGEN SATURATION: 98 % | BODY MASS INDEX: 28.66 KG/M2 | SYSTOLIC BLOOD PRESSURE: 102 MMHG | WEIGHT: 172 LBS | DIASTOLIC BLOOD PRESSURE: 79 MMHG | HEART RATE: 95 BPM | HEIGHT: 65 IN

## 2021-02-23 DIAGNOSIS — G47.00 INSOMNIA, UNSPECIFIED TYPE: ICD-10-CM

## 2021-02-23 DIAGNOSIS — M54.2 NECK PAIN: ICD-10-CM

## 2021-02-23 DIAGNOSIS — I10 ESSENTIAL HYPERTENSION: ICD-10-CM

## 2021-02-23 DIAGNOSIS — J30.2 SEASONAL ALLERGIC RHINITIS: ICD-10-CM

## 2021-02-23 DIAGNOSIS — R61 NIGHT SWEATS: Primary | ICD-10-CM

## 2021-02-23 DIAGNOSIS — F41.9 ANXIETY: ICD-10-CM

## 2021-02-23 PROCEDURE — 99214 OFFICE O/P EST MOD 30 MIN: CPT | Performed by: NURSE PRACTITIONER

## 2021-02-23 RX ORDER — MEDROXYPROGESTERONE ACETATE 5 MG/1
5 TABLET ORAL DAILY
Qty: 30 TABLET | Refills: 3 | Status: SHIPPED | OUTPATIENT
Start: 2021-02-23 | End: 2021-03-29 | Stop reason: SDUPTHER

## 2021-02-23 RX ORDER — CYCLOBENZAPRINE HCL 5 MG
5 TABLET ORAL 2 TIMES DAILY PRN
Qty: 30 TABLET | Refills: 3 | Status: SHIPPED | OUTPATIENT
Start: 2021-02-23 | End: 2021-08-09 | Stop reason: SDUPTHER

## 2021-02-23 RX ORDER — DIPHENHYDRAMINE HCL 25 MG
25 CAPSULE ORAL DAILY
Qty: 120 CAPSULE | Refills: 4 | Status: SHIPPED | OUTPATIENT
Start: 2021-02-23 | End: 2021-08-09 | Stop reason: SDUPTHER

## 2021-02-23 RX ORDER — ESTROGEN,CON/M-PROGEST ACET 0.625-5 MG
1 TABLET ORAL DAILY
Qty: 28 TABLET | Refills: 3 | Status: SHIPPED | OUTPATIENT
Start: 2021-02-23 | End: 2021-02-23 | Stop reason: CLARIF

## 2021-02-23 RX ORDER — TRAZODONE HYDROCHLORIDE 50 MG/1
TABLET ORAL
Qty: 60 TABLET | Refills: 3 | Status: SHIPPED | OUTPATIENT
Start: 2021-02-23 | End: 2021-03-23 | Stop reason: SDUPTHER

## 2021-02-23 RX ORDER — ESTRADIOL 0.5 MG/1
0.5 TABLET ORAL DAILY
Qty: 30 TABLET | Refills: 3 | Status: SHIPPED | OUTPATIENT
Start: 2021-02-23 | End: 2021-03-23 | Stop reason: SDUPTHER

## 2021-02-23 RX ORDER — LOSARTAN POTASSIUM 50 MG/1
50 TABLET ORAL DAILY
Qty: 30 TABLET | Refills: 3 | Status: SHIPPED | OUTPATIENT
Start: 2021-02-23 | End: 2021-03-23 | Stop reason: SDUPTHER

## 2021-02-23 NOTE — TELEPHONE ENCOUNTER
Caller: Swapna Schaffer    Relationship to patient: Self    Best call back number: [phone]    Patient is ASKING QUESTIONS ABOUT THE ESTROGEN MEDICATION THAT SHE DID NOT RECEIVE AT PHARMACY. RADHA KRUGER SAID AT THE APPOINTMENT TIME THAT POSSIBLY TWO MEDICATIONS WOULD NEED TO BE CALLED IN.    PLEASE ADVISE.

## 2021-03-05 NOTE — PROGRESS NOTES
Subjective   Swapna Schaffer is a 45 y.o. female.     swapna Schaffer is a 45 comes in today stating that she is having hot flashes night sweats are getting worse.  Her menstrual cycle is somewhat irregular and other family members have gone through menopause early.  She wants to go on hormone replacement.  She has had mammogram and it is normal.  Also she states that her anxiety seems to be getting worse with some insomnia and nightmares.  She wants to go on medication for this.  She has history of hypertension that is well controlled with losartan.    Anxiety  Presents for follow-up visit. Symptoms include excessive worry, irritability, nervous/anxious behavior and restlessness. Patient reports no chest pain, compulsions, confusion, decreased concentration, depressed mood, dizziness, dry mouth, feeling of choking, hyperventilation, impotence, insomnia, malaise, muscle tension, nausea, obsessions, palpitations, panic, shortness of breath or suicidal ideas. Symptoms occur most days. The quality of sleep is poor. Nighttime awakenings: several.       Night Sweats  This is a new problem. The current episode started more than 1 month ago. The problem occurs constantly. The problem has been unchanged. Pertinent negatives include no abdominal pain, anorexia, arthralgias, change in bowel habit, chest pain, chills, congestion, coughing, diaphoresis, fatigue, fever, headaches, joint swelling, myalgias, nausea, neck pain, numbness, rash, sore throat, swollen glands, urinary symptoms, vertigo, visual change, vomiting or weakness. The symptoms are aggravated by stress. She has tried nothing for the symptoms. The treatment provided no relief.   Hypertension  This is a chronic problem. The current episode started more than 1 year ago. The problem is unchanged. The problem is controlled. Associated symptoms include anxiety. Pertinent negatives include no blurred vision, chest pain, headaches, malaise/fatigue, neck pain,  orthopnea, palpitations, peripheral edema, PND, shortness of breath or sweats. There are no associated agents to hypertension. Risk factors for coronary artery disease include sedentary lifestyle. Past treatments include angiotensin blockers. Current antihypertension treatment includes angiotensin blockers. The current treatment provides significant improvement. There are no compliance problems.  There is no history of angina, kidney disease, CAD/MI, CVA, heart failure, left ventricular hypertrophy, PVD or retinopathy.        The following portions of the patient's history were reviewed and updated as appropriate: allergies, current medications, past family history, past medical history, past social history, past surgical history and problem list.    Review of Systems   Constitutional: Positive for irritability and night sweats. Negative for chills, diaphoresis, fatigue, fever and malaise/fatigue.   HENT: Negative.  Negative for congestion and sore throat.    Eyes: Negative.  Negative for blurred vision.   Respiratory: Negative.  Negative for cough and shortness of breath.    Cardiovascular: Negative.  Negative for chest pain, palpitations, orthopnea and PND.   Gastrointestinal: Negative.  Negative for abdominal pain, anorexia, change in bowel habit, nausea and vomiting.   Genitourinary: Negative.  Negative for impotence.   Musculoskeletal: Negative.  Negative for arthralgias, joint swelling, myalgias and neck pain.   Skin: Negative.  Negative for rash.   Neurological: Negative.  Negative for dizziness, vertigo, weakness, numbness and headaches.   Psychiatric/Behavioral: Negative for confusion, decreased concentration and suicidal ideas. The patient is nervous/anxious. The patient does not have insomnia.        Objective   Physical Exam  Vitals signs and nursing note reviewed.   Constitutional:       General: She is not in acute distress.     Appearance: She is well-developed.   HENT:      Head: Normocephalic and  atraumatic.      Right Ear: External ear normal.      Left Ear: External ear normal.      Nose: Nose normal.      Mouth/Throat:      Pharynx: No oropharyngeal exudate.   Eyes:      Pupils: Pupils are equal, round, and reactive to light.   Neck:      Musculoskeletal: Normal range of motion and neck supple.      Thyroid: No thyromegaly.   Cardiovascular:      Rate and Rhythm: Normal rate and regular rhythm.      Heart sounds: Normal heart sounds. No murmur. No friction rub.   Pulmonary:      Effort: Pulmonary effort is normal. No respiratory distress.      Breath sounds: Normal breath sounds. No wheezing or rales.   Abdominal:      Palpations: Abdomen is soft.   Musculoskeletal: Normal range of motion.   Skin:     General: Skin is warm and dry.   Neurological:      Mental Status: She is alert and oriented to person, place, and time.   Psychiatric:         Thought Content: Thought content normal.         Assessment/Plan   Diagnoses and all orders for this visit:    1. Night sweats (Primary)  -     Discontinue: estrogen, conjugated,-medroxyprogesterone (Prempro) 0.625-5 MG per tablet; Take 1 tablet by mouth Daily.  Dispense: 28 tablet; Refill: 3  -     estradiol (ESTRACE) 0.5 MG tablet; Take 1 tablet by mouth Daily.  Dispense: 30 tablet; Refill: 3  -     medroxyPROGESTERone (Provera) 5 MG tablet; Take 1 tablet by mouth Daily.  Dispense: 30 tablet; Refill: 3    2. Anxiety  -     traZODone (DESYREL) 50 MG tablet; One or two q hs for anxiety and insomnia  Dispense: 60 tablet; Refill: 3    3. Insomnia, unspecified type  -     traZODone (DESYREL) 50 MG tablet; One or two q hs for anxiety and insomnia  Dispense: 60 tablet; Refill: 3    4. Seasonal allergic rhinitis  -     diphenhydrAMINE (Banophen) 25 mg capsule; Take 1 capsule by mouth Daily.  Dispense: 120 capsule; Refill: 4    5. Neck pain  -     cyclobenzaprine (FLEXERIL) 5 MG tablet; Take 1 tablet by mouth 2 (Two) Times a Day As Needed for Muscle Spasms.  Dispense: 30  tablet; Refill: 3    6. Essential hypertension  -     losartan (COZAAR) 50 MG tablet; Take 1 tablet by mouth Daily.  Dispense: 30 tablet; Refill: 3      bmi to be 28.6 which is considered overweight.  Diet and exercise information will be provided the patient.

## 2021-03-05 NOTE — PATIENT INSTRUCTIONS
Calorie Counting for Weight Loss  Calories are units of energy. Your body needs a certain amount of calories from food to keep you going throughout the day. When you eat more calories than your body needs, your body stores the extra calories as fat. When you eat fewer calories than your body needs, your body burns fat to get the energy it needs.  Calorie counting means keeping track of how many calories you eat and drink each day. Calorie counting can be helpful if you need to lose weight. If you make sure to eat fewer calories than your body needs, you should lose weight. Ask your health care provider what a healthy weight is for you.  For calorie counting to work, you will need to eat the right number of calories in a day in order to lose a healthy amount of weight per week. A dietitian can help you determine how many calories you need in a day and will give you suggestions on how to reach your calorie goal.  · A healthy amount of weight to lose per week is usually 1-2 lb (0.5-0.9 kg). This usually means that your daily calorie intake should be reduced by 500-750 calories.  · Eating 1,200 - 1,500 calories per day can help most women lose weight.  · Eating 1,500 - 1,800 calories per day can help most men lose weight.  What is my plan?  My goal is to have __________ calories per day.  If I have this many calories per day, I should lose around __________ pounds per week.  What do I need to know about calorie counting?  In order to meet your daily calorie goal, you will need to:  · Find out how many calories are in each food you would like to eat. Try to do this before you eat.  · Decide how much of the food you plan to eat.  · Write down what you ate and how many calories it had. Doing this is called keeping a food log.  To successfully lose weight, it is important to balance calorie counting with a healthy lifestyle that includes regular activity. Aim for 150 minutes of moderate exercise (such as walking) or 75  minutes of vigorous exercise (such as running) each week.  Where do I find calorie information?    The number of calories in a food can be found on a Nutrition Facts label. If a food does not have a Nutrition Facts label, try to look up the calories online or ask your dietitian for help.  Remember that calories are listed per serving. If you choose to have more than one serving of a food, you will have to multiply the calories per serving by the amount of servings you plan to eat. For example, the label on a package of bread might say that a serving size is 1 slice and that there are 90 calories in a serving. If you eat 1 slice, you will have eaten 90 calories. If you eat 2 slices, you will have eaten 180 calories.  How do I keep a food log?  Immediately after each meal, record the following information in your food log:  · What you ate. Don't forget to include toppings, sauces, and other extras on the food.  · How much you ate. This can be measured in cups, ounces, or number of items.  · How many calories each food and drink had.  · The total number of calories in the meal.  Keep your food log near you, such as in a small notebook in your pocket, or use a mobile sally or website. Some programs will calculate calories for you and show you how many calories you have left for the day to meet your goal.  What are some calorie counting tips?    · Use your calories on foods and drinks that will fill you up and not leave you hungry:  ? Some examples of foods that fill you up are nuts and nut butters, vegetables, lean proteins, and high-fiber foods like whole grains. High-fiber foods are foods with more than 5 g fiber per serving.  ? Drinks such as sodas, specialty coffee drinks, alcohol, and juices have a lot of calories, yet do not fill you up.  · Eat nutritious foods and avoid empty calories. Empty calories are calories you get from foods or beverages that do not have many vitamins or protein, such as candy, sweets, and  "soda. It is better to have a nutritious high-calorie food (such as an avocado) than a food with few nutrients (such as a bag of chips).  · Know how many calories are in the foods you eat most often. This will help you calculate calorie counts faster.  · Pay attention to calories in drinks. Low-calorie drinks include water and unsweetened drinks.  · Pay attention to nutrition labels for \"low fat\" or \"fat free\" foods. These foods sometimes have the same amount of calories or more calories than the full fat versions. They also often have added sugar, starch, or salt, to make up for flavor that was removed with the fat.  · Find a way of tracking calories that works for you. Get creative. Try different apps or programs if writing down calories does not work for you.  What are some portion control tips?  · Know how many calories are in a serving. This will help you know how many servings of a certain food you can have.  · Use a measuring cup to measure serving sizes. You could also try weighing out portions on a kitchen scale. With time, you will be able to estimate serving sizes for some foods.  · Take some time to put servings of different foods on your favorite plates, bowls, and cups so you know what a serving looks like.  · Try not to eat straight from a bag or box. Doing this can lead to overeating. Put the amount you would like to eat in a cup or on a plate to make sure you are eating the right portion.  · Use smaller plates, glasses, and bowls to prevent overeating.  · Try not to multitask (for example, watch TV or use your computer) while eating. If it is time to eat, sit down at a table and enjoy your food. This will help you to know when you are full. It will also help you to be aware of what you are eating and how much you are eating.  What are tips for following this plan?  Reading food labels  · Check the calorie count compared to the serving size. The serving size may be smaller than what you are used to " "eating.  · Check the source of the calories. Make sure the food you are eating is high in vitamins and protein and low in saturated and trans fats.  Shopping  · Read nutrition labels while you shop. This will help you make healthy decisions before you decide to purchase your food.  · Make a grocery list and stick to it.  Cooking  · Try to cook your favorite foods in a healthier way. For example, try baking instead of frying.  · Use low-fat dairy products.  Meal planning  · Use more fruits and vegetables. Half of your plate should be fruits and vegetables.  · Include lean proteins like poultry and fish.  How do I count calories when eating out?  · Ask for smaller portion sizes.  · Consider sharing an entree and sides instead of getting your own entree.  · If you get your own entree, eat only half. Ask for a box at the beginning of your meal and put the rest of your entree in it so you are not tempted to eat it.  · If calories are listed on the menu, choose the lower calorie options.  · Choose dishes that include vegetables, fruits, whole grains, low-fat dairy products, and lean protein.  · Choose items that are boiled, broiled, grilled, or steamed. Stay away from items that are buttered, battered, fried, or served with cream sauce. Items labeled \"crispy\" are usually fried, unless stated otherwise.  · Choose water, low-fat milk, unsweetened iced tea, or other drinks without added sugar. If you want an alcoholic beverage, choose a lower calorie option such as a glass of wine or light beer.  · Ask for dressings, sauces, and syrups on the side. These are usually high in calories, so you should limit the amount you eat.  · If you want a salad, choose a garden salad and ask for grilled meats. Avoid extra toppings like mota, cheese, or fried items. Ask for the dressing on the side, or ask for olive oil and vinegar or lemon to use as dressing.  · Estimate how many servings of a food you are given. For example, a serving of " cooked rice is ½ cup or about the size of half a baseball. Knowing serving sizes will help you be aware of how much food you are eating at restaurants. The list below tells you how big or small some common portion sizes are based on everyday objects:  ? 1 oz--4 stacked dice.  ? 3 oz--1 deck of cards.  ? 1 tsp--1 die.  ? 1 Tbsp--½ a ping-pong ball.  ? 2 Tbsp--1 ping-pong ball.  ? ½ cup--½ baseball.  ? 1 cup--1 baseball.  Summary  · Calorie counting means keeping track of how many calories you eat and drink each day. If you eat fewer calories than your body needs, you should lose weight.  · A healthy amount of weight to lose per week is usually 1-2 lb (0.5-0.9 kg). This usually means reducing your daily calorie intake by 500-750 calories.  · The number of calories in a food can be found on a Nutrition Facts label. If a food does not have a Nutrition Facts label, try to look up the calories online or ask your dietitian for help.  · Use your calories on foods and drinks that will fill you up, and not on foods and drinks that will leave you hungry.  · Use smaller plates, glasses, and bowls to prevent overeating.  This information is not intended to replace advice given to you by your health care provider. Make sure you discuss any questions you have with your health care provider.  Document Revised: 09/06/2019 Document Reviewed: 11/17/2017  [employer] Patient Education © 2020 [employer]      Exercising to Lose Weight  Exercise is structured, repetitive physical activity to improve fitness and health. Getting regular exercise is important for everyone. It is especially important if you are overweight. Being overweight increases your risk of heart disease, stroke, diabetes, high blood pressure, and several types of cancer. Reducing your calorie intake and exercising can help you lose weight.  Exercise is usually categorized as moderate or vigorous intensity. To lose weight, most people need to do a certain amount of  moderate-intensity or vigorous-intensity exercise each week.  Moderate-intensity exercise    Moderate-intensity exercise is any activity that gets you moving enough to burn at least three times more energy (calories) than if you were sitting.  Examples of moderate exercise include:  · Walking a mile in 15 minutes.  · Doing light yard work.  · Biking at an easy pace.  Most people should get at least 150 minutes (2 hours and 30 minutes) a week of moderate-intensity exercise to maintain their body weight.  Vigorous-intensity exercise  Vigorous-intensity exercise is any activity that gets you moving enough to burn at least six times more calories than if you were sitting. When you exercise at this intensity, you should be working hard enough that you are not able to carry on a conversation.  Examples of vigorous exercise include:  · Running.  · Playing a team sport, such as football, basketball, and soccer.  · Jumping rope.  Most people should get at least 75 minutes (1 hour and 15 minutes) a week of vigorous-intensity exercise to maintain their body weight.  How can exercise affect me?  When you exercise enough to burn more calories than you eat, you lose weight. Exercise also reduces body fat and builds muscle. The more muscle you have, the more calories you burn. Exercise also:  · Improves mood.  · Reduces stress and tension.  · Improves your overall fitness, flexibility, and endurance.  · Increases bone strength.  The amount of exercise you need to lose weight depends on:  · Your age.  · The type of exercise.  · Any health conditions you have.  · Your overall physical ability.  Talk to your health care provider about how much exercise you need and what types of activities are safe for you.  What actions can I take to lose weight?  Nutrition    · Make changes to your diet as told by your health care provider or diet and nutrition specialist (dietitian). This may include:  ? Eating fewer calories.  ? Eating more  protein.  ? Eating less unhealthy fats.  ? Eating a diet that includes fresh fruits and vegetables, whole grains, low-fat dairy products, and lean protein.  ? Avoiding foods with added fat, salt, and sugar.  · Drink plenty of water while you exercise to prevent dehydration or heat stroke.  Activity  · Choose an activity that you enjoy and set realistic goals. Your health care provider can help you make an exercise plan that works for you.  · Exercise at a moderate or vigorous intensity most days of the week.  ? The intensity of exercise may vary from person to person. You can tell how intense a workout is for you by paying attention to your breathing and heartbeat. Most people will notice their breathing and heartbeat get faster with more intense exercise.  · Do resistance training twice each week, such as:  ? Push-ups.  ? Sit-ups.  ? Lifting weights.  ? Using resistance bands.  · Getting short amounts of exercise can be just as helpful as long structured periods of exercise. If you have trouble finding time to exercise, try to include exercise in your daily routine.  ? Get up, stretch, and walk around every 30 minutes throughout the day.  ? Go for a walk during your lunch break.  ? Park your car farther away from your destination.  ? If you take public transportation, get off one stop early and walk the rest of the way.  ? Make phone calls while standing up and walking around.  ? Take the stairs instead of elevators or escalators.  · Wear comfortable clothes and shoes with good support.  · Do not exercise so much that you hurt yourself, feel dizzy, or get very short of breath.  Where to find more information  · [place] Department of Health and Human Services: www.hhs.gov  · Centers for Disease Control and Prevention (CDC): www.cdc.gov  Contact a health care provider:  · Before starting a new exercise program.  · If you have questions or concerns about your weight.  · If you have a medical problem that keeps you from  exercising.  Get help right away if you have any of the following while exercising:  · Injury.  · Dizziness.  · Difficulty breathing or shortness of breath that does not go away when you stop exercising.  · Chest pain.  · Rapid heartbeat.  Summary  · Being overweight increases your risk of heart disease, stroke, diabetes, high blood pressure, and several types of cancer.  · Losing weight happens when you burn more calories than you eat.  · Reducing the amount of calories you eat in addition to getting regular moderate or vigorous exercise each week helps you lose weight.  This information is not intended to replace advice given to you by your health care provider. Make sure you discuss any questions you have with your health care provider.  Document Revised: 12/31/2018 Document Reviewed: 12/31/2018  [employer] Patient Education © 2020 [employer]

## 2021-03-23 ENCOUNTER — LAB (OUTPATIENT)
Dept: LAB | Facility: HOSPITAL | Age: 46
End: 2021-03-23

## 2021-03-23 ENCOUNTER — OFFICE VISIT (OUTPATIENT)
Dept: FAMILY MEDICINE CLINIC | Facility: CLINIC | Age: 46
End: 2021-03-23

## 2021-03-23 VITALS
BODY MASS INDEX: 28.99 KG/M2 | DIASTOLIC BLOOD PRESSURE: 103 MMHG | OXYGEN SATURATION: 97 % | SYSTOLIC BLOOD PRESSURE: 155 MMHG | HEART RATE: 108 BPM | HEIGHT: 65 IN | TEMPERATURE: 97.3 F | WEIGHT: 174 LBS

## 2021-03-23 DIAGNOSIS — R61 NIGHT SWEATS: ICD-10-CM

## 2021-03-23 DIAGNOSIS — I10 ESSENTIAL HYPERTENSION: ICD-10-CM

## 2021-03-23 DIAGNOSIS — M25.50 ARTHRALGIA, UNSPECIFIED JOINT: Primary | ICD-10-CM

## 2021-03-23 DIAGNOSIS — F41.9 ANXIETY: ICD-10-CM

## 2021-03-23 DIAGNOSIS — G47.00 INSOMNIA, UNSPECIFIED TYPE: ICD-10-CM

## 2021-03-23 DIAGNOSIS — J45.909 UNCOMPLICATED ASTHMA, UNSPECIFIED ASTHMA SEVERITY, UNSPECIFIED WHETHER PERSISTENT: ICD-10-CM

## 2021-03-23 DIAGNOSIS — R51.9 NONINTRACTABLE HEADACHE, UNSPECIFIED CHRONICITY PATTERN, UNSPECIFIED HEADACHE TYPE: ICD-10-CM

## 2021-03-23 PROCEDURE — 99214 OFFICE O/P EST MOD 30 MIN: CPT | Performed by: NURSE PRACTITIONER

## 2021-03-23 PROCEDURE — 86431 RHEUMATOID FACTOR QUANT: CPT | Performed by: NURSE PRACTITIONER

## 2021-03-23 PROCEDURE — 86038 ANTINUCLEAR ANTIBODIES: CPT | Performed by: NURSE PRACTITIONER

## 2021-03-23 RX ORDER — ESTRADIOL 0.5 MG/1
0.5 TABLET ORAL DAILY
Qty: 30 TABLET | Refills: 3 | Status: SHIPPED | OUTPATIENT
Start: 2021-03-23 | End: 2021-10-04 | Stop reason: SDUPTHER

## 2021-03-23 RX ORDER — LOSARTAN POTASSIUM 50 MG/1
50 TABLET ORAL DAILY
Qty: 30 TABLET | Refills: 3 | Status: SHIPPED | OUTPATIENT
Start: 2021-03-23 | End: 2021-12-13 | Stop reason: SDUPTHER

## 2021-03-23 RX ORDER — TRAZODONE HYDROCHLORIDE 50 MG/1
TABLET ORAL
Qty: 60 TABLET | Refills: 3 | Status: SHIPPED | OUTPATIENT
Start: 2021-03-23 | End: 2021-10-04 | Stop reason: SDUPTHER

## 2021-03-23 RX ORDER — GABAPENTIN 100 MG/1
CAPSULE ORAL
COMMUNITY
End: 2021-06-25

## 2021-03-23 RX ORDER — ALBUTEROL SULFATE 90 UG/1
2 AEROSOL, METERED RESPIRATORY (INHALATION) EVERY 4 HOURS PRN
Qty: 18 G | Refills: 3 | Status: SHIPPED | OUTPATIENT
Start: 2021-03-23 | End: 2021-08-09 | Stop reason: SDUPTHER

## 2021-03-23 RX ORDER — IBUPROFEN 600 MG/1
600 TABLET ORAL EVERY 8 HOURS PRN
Qty: 90 TABLET | Refills: 1 | Status: SHIPPED | OUTPATIENT
Start: 2021-03-23 | End: 2021-07-02 | Stop reason: SDUPTHER

## 2021-03-24 LAB — CHROMATIN AB SERPL-ACNC: <10 IU/ML (ref 0–14)

## 2021-03-25 LAB — ANA SER QL: NEGATIVE

## 2021-03-29 RX ORDER — MEDROXYPROGESTERONE ACETATE 5 MG/1
5 TABLET ORAL DAILY
Qty: 30 TABLET | Refills: 3 | Status: SHIPPED | OUTPATIENT
Start: 2021-03-29 | End: 2021-10-04 | Stop reason: SDUPTHER

## 2021-03-29 NOTE — PROGRESS NOTES
Subjective   Swapna Hernandez is a 45 y.o. female.     Swapna hernandez age 45 comes in today for recheck.  She states that in 2015 she was diagnosed with lupus and she has seen Dr. Whittington in the past.  She reports that she is having joint pain once again and wants to be rechecked for lupus and rheumatoid arthritis.  She also been having some problems with hot flashes night sweats and she is taking estradiol and medroxyprogesterone in low doses and it does seem to be helping quite a bit.    Neck Pain   This is a chronic problem. The current episode started more than 1 year ago. The problem occurs constantly. The problem has been waxing and waning. The pain is associated with nothing. Pain location: multiple joints. The quality of the pain is described as aching. The pain is at a severity of 4/10. The pain is moderate. The symptoms are aggravated by stress (exertion). The pain is same all the time. Stiffness is present in the morning. Pertinent negatives include no chest pain, fever, headaches, leg pain, numbness, pain with swallowing, paresis, photophobia, syncope, tingling, trouble swallowing, visual change, weakness or weight loss. She has tried NSAIDs for the symptoms. The treatment provided mild relief.   Night Sweats  This is a chronic problem. The current episode started more than 1 month ago. The problem occurs constantly. The problem has been gradually improving. Associated symptoms include arthralgias and neck pain. Pertinent negatives include no abdominal pain, anorexia, change in bowel habit, chest pain, chills, congestion, coughing, diaphoresis, fatigue, fever, headaches, joint swelling, myalgias, nausea, numbness, rash, sore throat, swollen glands, urinary symptoms, vertigo, visual change, vomiting or weakness. The symptoms are aggravated by stress. Treatments tried: estradiol and provera. The treatment provided significant relief.        The following portions of the patient's history were  reviewed and updated as appropriate: allergies, current medications, past family history, past medical history, past social history, past surgical history and problem list.    Review of Systems   Constitutional: Positive for night sweats. Negative for chills, diaphoresis, fatigue, fever and weight loss.   HENT: Negative.  Negative for congestion, sore throat and trouble swallowing.    Eyes: Negative.  Negative for photophobia.   Respiratory: Negative.  Negative for cough.    Cardiovascular: Negative.  Negative for chest pain and syncope.   Gastrointestinal: Negative.  Negative for abdominal pain, anorexia, change in bowel habit, nausea and vomiting.   Genitourinary: Negative.    Musculoskeletal: Positive for arthralgias and neck pain. Negative for joint swelling and myalgias.   Skin: Negative.  Negative for rash.   Neurological: Negative.  Negative for vertigo, tingling, weakness, numbness and headaches.   Psychiatric/Behavioral: Negative.        Objective   Physical Exam  Vitals and nursing note reviewed.   Constitutional:       General: She is not in acute distress.     Appearance: She is well-developed.   HENT:      Head: Normocephalic and atraumatic.      Right Ear: External ear normal.      Left Ear: External ear normal.      Nose: Nose normal.      Mouth/Throat:      Pharynx: No oropharyngeal exudate.   Eyes:      Pupils: Pupils are equal, round, and reactive to light.   Neck:      Thyroid: No thyromegaly.   Cardiovascular:      Rate and Rhythm: Normal rate and regular rhythm.      Heart sounds: Normal heart sounds. No murmur heard.   No friction rub.   Pulmonary:      Effort: Pulmonary effort is normal. No respiratory distress.      Breath sounds: Normal breath sounds. No wheezing or rales.   Abdominal:      Palpations: Abdomen is soft.   Musculoskeletal:         General: Normal range of motion.      Cervical back: Normal range of motion and neck supple.   Skin:     General: Skin is warm and dry.    Neurological:      Mental Status: She is alert and oriented to person, place, and time.   Psychiatric:         Thought Content: Thought content normal.         Assessment/Plan   Diagnoses and all orders for this visit:    1. Arthralgia, unspecified joint (Primary)  -     APOLLO  -     Rheumatoid factor    2. Night sweats  -     estradiol (ESTRACE) 0.5 MG tablet; Take 1 tablet by mouth Daily.  Dispense: 30 tablet; Refill: 3  -     medroxyPROGESTERone (Provera) 5 MG tablet; Take 1 tablet by mouth Daily.  Dispense: 30 tablet; Refill: 3    3. Nonintractable headache, unspecified chronicity pattern, unspecified headache type  -     ibuprofen (ADVIL,MOTRIN) 600 MG tablet; Take 1 tablet by mouth Every 8 (Eight) Hours As Needed for Moderate Pain .  Dispense: 90 tablet; Refill: 1    4. Essential hypertension  -     losartan (COZAAR) 50 MG tablet; Take 1 tablet by mouth Daily.  Dispense: 30 tablet; Refill: 3    5. Anxiety  -     traZODone (DESYREL) 50 MG tablet; One or two q hs for anxiety and insomnia  Dispense: 60 tablet; Refill: 3    6. Insomnia, unspecified type  -     traZODone (DESYREL) 50 MG tablet; One or two q hs for anxiety and insomnia  Dispense: 60 tablet; Refill: 3    7. Uncomplicated asthma, unspecified asthma severity, unspecified whether persistent  -     albuterol sulfate  (90 Base) MCG/ACT inhaler; Inhale 2 puffs Every 4 (Four) Hours As Needed for Wheezing.  Dispense: 18 g; Refill: 3      bmi is noted to be 29 which is considered overweight.  Diet exercise information will be provided the patient.

## 2021-06-25 ENCOUNTER — LAB (OUTPATIENT)
Dept: LAB | Facility: HOSPITAL | Age: 46
End: 2021-06-25

## 2021-06-25 ENCOUNTER — OFFICE VISIT (OUTPATIENT)
Dept: FAMILY MEDICINE CLINIC | Facility: CLINIC | Age: 46
End: 2021-06-25

## 2021-06-25 VITALS
HEIGHT: 65 IN | RESPIRATION RATE: 20 BRPM | SYSTOLIC BLOOD PRESSURE: 110 MMHG | HEART RATE: 110 BPM | DIASTOLIC BLOOD PRESSURE: 72 MMHG | BODY MASS INDEX: 28.99 KG/M2 | OXYGEN SATURATION: 99 % | WEIGHT: 174 LBS

## 2021-06-25 DIAGNOSIS — F19.90 EXCESSIVE USE OF NONSTEROIDAL ANTI-INFLAMMATORY DRUG (NSAID): ICD-10-CM

## 2021-06-25 DIAGNOSIS — Z11.59 NEED FOR HEPATITIS C SCREENING TEST: ICD-10-CM

## 2021-06-25 DIAGNOSIS — R53.83 FATIGUE, UNSPECIFIED TYPE: ICD-10-CM

## 2021-06-25 DIAGNOSIS — R51.9 NONINTRACTABLE HEADACHE, UNSPECIFIED CHRONICITY PATTERN, UNSPECIFIED HEADACHE TYPE: ICD-10-CM

## 2021-06-25 DIAGNOSIS — Z91.89 AT RISK FOR SLEEP APNEA: ICD-10-CM

## 2021-06-25 DIAGNOSIS — I10 ESSENTIAL HYPERTENSION: Primary | ICD-10-CM

## 2021-06-25 DIAGNOSIS — E61.1 IRON DEFICIENCY: ICD-10-CM

## 2021-06-25 DIAGNOSIS — G43.109 MIGRAINE WITH AURA AND WITHOUT STATUS MIGRAINOSUS, NOT INTRACTABLE: ICD-10-CM

## 2021-06-25 PROBLEM — K21.9 GASTROESOPHAGEAL REFLUX DISEASE: Status: ACTIVE | Noted: 2021-06-25

## 2021-06-25 PROBLEM — F17.210 CIGARETTE SMOKER: Status: ACTIVE | Noted: 2021-06-25

## 2021-06-25 PROBLEM — J30.9 ATOPIC RHINITIS: Status: ACTIVE | Noted: 2017-01-10

## 2021-06-25 PROBLEM — E21.3 HYPERPARATHYROIDISM: Status: ACTIVE | Noted: 2017-01-10

## 2021-06-25 PROBLEM — E55.9 VITAMIN D DEFICIENCY: Status: ACTIVE | Noted: 2021-06-25

## 2021-06-25 PROBLEM — L52 ERYTHEMA NODOSUM: Status: ACTIVE | Noted: 2021-06-25

## 2021-06-25 PROCEDURE — 84443 ASSAY THYROID STIM HORMONE: CPT | Performed by: STUDENT IN AN ORGANIZED HEALTH CARE EDUCATION/TRAINING PROGRAM

## 2021-06-25 PROCEDURE — 82728 ASSAY OF FERRITIN: CPT | Performed by: STUDENT IN AN ORGANIZED HEALTH CARE EDUCATION/TRAINING PROGRAM

## 2021-06-25 PROCEDURE — 99214 OFFICE O/P EST MOD 30 MIN: CPT | Performed by: STUDENT IN AN ORGANIZED HEALTH CARE EDUCATION/TRAINING PROGRAM

## 2021-06-25 PROCEDURE — 83540 ASSAY OF IRON: CPT | Performed by: STUDENT IN AN ORGANIZED HEALTH CARE EDUCATION/TRAINING PROGRAM

## 2021-06-25 PROCEDURE — 86803 HEPATITIS C AB TEST: CPT | Performed by: STUDENT IN AN ORGANIZED HEALTH CARE EDUCATION/TRAINING PROGRAM

## 2021-06-25 PROCEDURE — 82746 ASSAY OF FOLIC ACID SERUM: CPT | Performed by: STUDENT IN AN ORGANIZED HEALTH CARE EDUCATION/TRAINING PROGRAM

## 2021-06-25 PROCEDURE — 85025 COMPLETE CBC W/AUTO DIFF WBC: CPT | Performed by: STUDENT IN AN ORGANIZED HEALTH CARE EDUCATION/TRAINING PROGRAM

## 2021-06-25 PROCEDURE — 82607 VITAMIN B-12: CPT | Performed by: STUDENT IN AN ORGANIZED HEALTH CARE EDUCATION/TRAINING PROGRAM

## 2021-06-25 PROCEDURE — 80053 COMPREHEN METABOLIC PANEL: CPT | Performed by: STUDENT IN AN ORGANIZED HEALTH CARE EDUCATION/TRAINING PROGRAM

## 2021-06-25 PROCEDURE — 84466 ASSAY OF TRANSFERRIN: CPT | Performed by: STUDENT IN AN ORGANIZED HEALTH CARE EDUCATION/TRAINING PROGRAM

## 2021-06-25 RX ORDER — RIMEGEPANT SULFATE 75 MG/75MG
1 TABLET, ORALLY DISINTEGRATING ORAL DAILY PRN
Qty: 8 TABLET | Refills: 1 | Status: SHIPPED | OUTPATIENT
Start: 2021-06-25 | End: 2021-07-02 | Stop reason: SDUPTHER

## 2021-06-26 LAB
ALBUMIN SERPL-MCNC: 4.3 G/DL (ref 3.5–5.2)
ALBUMIN/GLOB SERPL: 1.7 G/DL
ALP SERPL-CCNC: 87 U/L (ref 39–117)
ALT SERPL W P-5'-P-CCNC: 46 U/L (ref 1–33)
ANION GAP SERPL CALCULATED.3IONS-SCNC: 8.8 MMOL/L (ref 5–15)
AST SERPL-CCNC: 30 U/L (ref 1–32)
BASOPHILS # BLD AUTO: 0.02 10*3/MM3 (ref 0–0.2)
BASOPHILS NFR BLD AUTO: 0.4 % (ref 0–1.5)
BILIRUB SERPL-MCNC: 0.3 MG/DL (ref 0–1.2)
BUN SERPL-MCNC: 13 MG/DL (ref 6–20)
BUN/CREAT SERPL: 16.9 (ref 7–25)
CALCIUM SPEC-SCNC: 10.6 MG/DL (ref 8.6–10.5)
CHLORIDE SERPL-SCNC: 105 MMOL/L (ref 98–107)
CO2 SERPL-SCNC: 22.2 MMOL/L (ref 22–29)
CREAT SERPL-MCNC: 0.77 MG/DL (ref 0.57–1)
DEPRECATED RDW RBC AUTO: 43.8 FL (ref 37–54)
EOSINOPHIL # BLD AUTO: 0 10*3/MM3 (ref 0–0.4)
EOSINOPHIL NFR BLD AUTO: 0 % (ref 0.3–6.2)
ERYTHROCYTE [DISTWIDTH] IN BLOOD BY AUTOMATED COUNT: 13.2 % (ref 12.3–15.4)
FERRITIN SERPL-MCNC: 88.3 NG/ML (ref 13–150)
FOLATE SERPL-MCNC: 3 NG/ML (ref 4.78–24.2)
GFR SERPL CREATININE-BSD FRML MDRD: 97 ML/MIN/1.73
GLOBULIN UR ELPH-MCNC: 2.5 GM/DL
GLUCOSE SERPL-MCNC: 71 MG/DL (ref 65–99)
HCT VFR BLD AUTO: 43.7 % (ref 34–46.6)
HGB BLD-MCNC: 14 G/DL (ref 12–15.9)
IMM GRANULOCYTES # BLD AUTO: 0.01 10*3/MM3 (ref 0–0.05)
IMM GRANULOCYTES NFR BLD AUTO: 0.2 % (ref 0–0.5)
IRON 24H UR-MRATE: 106 MCG/DL (ref 37–145)
IRON SATN MFR SERPL: 30 % (ref 20–50)
LYMPHOCYTES # BLD AUTO: 2.18 10*3/MM3 (ref 0.7–3.1)
LYMPHOCYTES NFR BLD AUTO: 41.9 % (ref 19.6–45.3)
MCH RBC QN AUTO: 28.7 PG (ref 26.6–33)
MCHC RBC AUTO-ENTMCNC: 32 G/DL (ref 31.5–35.7)
MCV RBC AUTO: 89.7 FL (ref 79–97)
MONOCYTES # BLD AUTO: 0.44 10*3/MM3 (ref 0.1–0.9)
MONOCYTES NFR BLD AUTO: 8.5 % (ref 5–12)
NEUTROPHILS NFR BLD AUTO: 2.55 10*3/MM3 (ref 1.7–7)
NEUTROPHILS NFR BLD AUTO: 49 % (ref 42.7–76)
NRBC BLD AUTO-RTO: 0 /100 WBC (ref 0–0.2)
PLATELET # BLD AUTO: 351 10*3/MM3 (ref 140–450)
PMV BLD AUTO: 11.3 FL (ref 6–12)
POTASSIUM SERPL-SCNC: 4.2 MMOL/L (ref 3.5–5.2)
PROT SERPL-MCNC: 6.8 G/DL (ref 6–8.5)
RBC # BLD AUTO: 4.87 10*6/MM3 (ref 3.77–5.28)
SODIUM SERPL-SCNC: 136 MMOL/L (ref 136–145)
TIBC SERPL-MCNC: 349 MCG/DL (ref 298–536)
TRANSFERRIN SERPL-MCNC: 234 MG/DL (ref 200–360)
TSH SERPL DL<=0.05 MIU/L-ACNC: 0.91 UIU/ML (ref 0.27–4.2)
VIT B12 BLD-MCNC: 255 PG/ML (ref 211–946)
WBC # BLD AUTO: 5.2 10*3/MM3 (ref 3.4–10.8)

## 2021-06-27 LAB
HCV AB S/CO SERPL IA: <0.1 S/CO RATIO (ref 0–0.9)
HCV AB SERPL QL IA: NORMAL

## 2021-07-02 ENCOUNTER — OFFICE VISIT (OUTPATIENT)
Dept: SLEEP MEDICINE | Facility: HOSPITAL | Age: 46
End: 2021-07-02

## 2021-07-02 VITALS
SYSTOLIC BLOOD PRESSURE: 157 MMHG | BODY MASS INDEX: 27.56 KG/M2 | HEIGHT: 65 IN | DIASTOLIC BLOOD PRESSURE: 94 MMHG | WEIGHT: 165.4 LBS | OXYGEN SATURATION: 97 % | HEART RATE: 91 BPM

## 2021-07-02 DIAGNOSIS — G43.109 MIGRAINE WITH AURA AND WITHOUT STATUS MIGRAINOSUS, NOT INTRACTABLE: ICD-10-CM

## 2021-07-02 DIAGNOSIS — R51.9 NONINTRACTABLE HEADACHE, UNSPECIFIED CHRONICITY PATTERN, UNSPECIFIED HEADACHE TYPE: ICD-10-CM

## 2021-07-02 DIAGNOSIS — F51.04 PSYCHOPHYSIOLOGICAL INSOMNIA: ICD-10-CM

## 2021-07-02 DIAGNOSIS — R06.83 SNORING: ICD-10-CM

## 2021-07-02 DIAGNOSIS — G25.81 RESTLESS LEGS SYNDROME (RLS): ICD-10-CM

## 2021-07-02 DIAGNOSIS — G47.19 EXCESSIVE DAYTIME SLEEPINESS: Primary | ICD-10-CM

## 2021-07-02 PROCEDURE — 99213 OFFICE O/P EST LOW 20 MIN: CPT | Performed by: NURSE PRACTITIONER

## 2021-07-02 NOTE — PROGRESS NOTES
New Patient Sleep Medicine Consultation    Encounter Date: 7/2/2021         Patient's PCP: Adis Gould MD  Referring provider: Adis Gould MD  Reason for consultation chief complaint: snoring, loud disruptive snoring, awakening gasping for breath, witnessed apneas, excessive daytime sleepiness, unrefreshing sleep and insomnia    Swapna Schaffer is a 45 y.o. female whose bedtime is ~ 2100 or 0030. She  falls asleep after 0-60 minutes, and is up 2-3 times per night. She wakes up ~ 2769-9722. She endorses 5-6 hours of sleep. She drinks 1 cups of coffee, 1-2 teas, and 5-6 sodas per day. She drinks 0 alcoholic beverages per week.  She rotates between day shift and second shift.     Swapna Schaffer admits to snoring, unrestful sleep, High blod pressure, gasping during sleep, working night shift or rotataing shifts, excessive daytime sleepiness, morning headaches, stop breathing during sleep, irritability, sleeping less than 6 hours per night, Disturbed or restless sleep, memory loss, sleepy driving, night sweats, abnormal or violent dreams, restless legs at night, difficulty falling asleep, difficulty staying asleep and takes medicine to help go to sleep for 3 years. She denies cataplexy, sleep paralysis, or hypnagogic hallucinations. She takes trazodone for sleep which mostly helps. She has occasional sleepiness with driving.Denies close calls or accidents related to falling asleep at the wheel. She naps  Most days. She has never had a sleep study. She sleeps in a bed with her .     She continues to smoke. Smoking history: smoked 0-1 ppds from age 18 until current. Advised to quit.       Marital status:    Occupation: works in a factory   Children: 2  FH of sleep disorders: two uncles and mother have sleep apnea       Past Medical History:   Diagnosis Date   • Abdominal pain    • Abnormal uterine bleeding     Other specified abnormal uterine and vaginal bleeding     • Acute  low back pain    • Diarrhea    • Epigastric pain    • Erythema nodosum    • Essential hypertension    • Excessive and frequent menstruation with irregular cycle    • Excessive or frequent menstruation    • Generalized anxiety disorder    • History of carpal tunnel syndrome    • Hypercalcemia    • Hyperthyroidism    • Leiomyoma of uterus     unspecified   • Metrorrhagia    • Nausea    • Neck pain     resolved    • Numbness of hand    • Secondary dysmenorrhea      Social History     Socioeconomic History   • Marital status:      Spouse name: Not on file   • Number of children: Not on file   • Years of education: Not on file   • Highest education level: Not on file   Tobacco Use   • Smoking status: Current Every Day Smoker     Packs/day: 1.00     Years: 15.00     Pack years: 15.00     Types: Cigarettes   • Smokeless tobacco: Never Used   • Tobacco comment: 10-19 cigs/day   Vaping Use   • Vaping Use: Never used   Substance and Sexual Activity   • Alcohol use: No   • Drug use: No   • Sexual activity: Yes     Family History   Problem Relation Age of Onset   • Diabetes Other    • Hypertension Other    • Stroke Other    • Thyroid disease Other          Review of Systems:  Constitutional: negative  Eyes: negative  Ears, nose, mouth, throat, and face: negative  Respiratory: negative  Cardiovascular: negative, reports occasional leg swelling   Gastrointestinal: negative  Genitourinary:negative  Integument/breast: negative  Hematologic/lymphatic: negative  Musculoskeletal:negative  Neurological: negative  Behavioral/Psych: negative  Endocrine: negative  Allergic/Immunologic: negative Patient advised to discuss any positive ROS with PCP.      [place] - 19      Vitals:    07/02/21 1301   BP: 157/94   Pulse: 91   SpO2: 97%           07/02/21  1301   Weight: 75 kg (165 lb 6.4 oz)       Body mass index is 27.52 kg/m². Patient's Body mass index is 27.52 kg/m². indicating that she is overweight (BMI 25-29.9). Obesity-related  "health conditions include the following: hypertension. Obesity is unchanged. BMI is is above average; BMI management plan is completed. We discussed portion control and increasing exercise..      Neck circumference: 13\"          General: Alert. Cooperative. Well developed. No acute distress.             Head:  Normocephalic. Symmetrical. Atraumatic.              Eyes: Sclera clear. No icterus. PERRLA. Normal EOM.             Ears: No deformities. Normal hearing.             Nose: No septal deviation. No drainage.          Throat: No oral lesions. No thrush. Moist mucous membranes. Trachea midline    Tongue is normal    Dentition is fair       Pharynx: Posterior pharyngeal pillars are narrow    Mallampati score of II (hard and soft palate, upper portion of tonsils anduvula visible)    Pharynx is nonerythematous   Chest Wall:  Normal shape. Symmetric expansion with respiration. No tenderness.          Lungs:  Clear to auscultation bilaterally. No wheezes. No rhonchi. No rales. Respirations regular, even and unlabored.            Heart:  Regular rhythm and normal rate. Normal S1 and S2. No murmur.  Extremities:  Moves all extremities well. No edema.           Pulses: Pulses palpable and equal bilaterally.               Skin: Dry. Intact. No bleeding. No rash.           Neuro: Moves all 4 extremities and cranial nerves grossly intact.  Psychiatric: Normal mood and affect.      Current Outpatient Medications:   •  albuterol sulfate  (90 Base) MCG/ACT inhaler, Inhale 2 puffs Every 4 (Four) Hours As Needed for Wheezing., Disp: 18 g, Rfl: 3  •  cyclobenzaprine (FLEXERIL) 5 MG tablet, Take 1 tablet by mouth 2 (Two) Times a Day As Needed for Muscle Spasms., Disp: 30 tablet, Rfl: 3  •  cyproheptadine (PERIACTIN) 4 MG tablet, TAKE 1 TABLET BY MOUTH TWICE DAILY AS NEEDED FOR ALLERGIES, Disp: 60 tablet, Rfl: 4  •  diphenhydrAMINE (Banophen) 25 mg capsule, Take 1 capsule by mouth Daily., Disp: 120 capsule, Rfl: 4  •  " estradiol (ESTRACE) 0.5 MG tablet, Take 1 tablet by mouth Daily., Disp: 30 tablet, Rfl: 3  •  fluticasone (FLONASE) 50 MCG/ACT nasal spray, 2 sprays into the nostril(s) as directed by provider Daily., Disp: 18.2 mL, Rfl: 3  •  ibuprofen (ADVIL,MOTRIN) 600 MG tablet, Take 1 tablet by mouth Every 8 (Eight) Hours As Needed for Moderate Pain ., Disp: 90 tablet, Rfl: 1  •  loratadine-pseudoephedrine (CLARITIN-D 24 HOUR)  MG per 24 hr tablet, Take 1 tablet by mouth Daily., Disp: 20 tablet, Rfl: 0  •  losartan (COZAAR) 50 MG tablet, Take 1 tablet by mouth Daily., Disp: 30 tablet, Rfl: 3  •  medroxyPROGESTERone (Provera) 5 MG tablet, Take 1 tablet by mouth Daily., Disp: 30 tablet, Rfl: 3  •  Rimegepant Sulfate (Nurtec) 75 MG tablet dispersible tablet, Take 1 tablet by mouth Daily As Needed (migraine)., Disp: 8 tablet, Rfl: 1  •  traZODone (DESYREL) 50 MG tablet, One or two q hs for anxiety and insomnia, Disp: 60 tablet, Rfl: 3    Lab Results   Component Value Date    WBC 5.20 06/25/2021    HGB 14.0 06/25/2021    HCT 43.7 06/25/2021    MCV 89.7 06/25/2021     06/25/2021     Lab Results   Component Value Date    GLUCOSE 71 06/25/2021    CALCIUM 10.6 (H) 06/25/2021     06/25/2021    K 4.2 06/25/2021    CO2 22.2 06/25/2021     06/25/2021    BUN 13 06/25/2021    CREATININE 0.77 06/25/2021    EGFRIFAFRI 97 06/25/2021    BCR 16.9 06/25/2021    ANIONGAP 8.8 06/25/2021     No results found for: INR, PROTIME  No results found for: CKTOTAL, CKMB, CKMBINDEX, TROPONINI, TROPONINT    No results found for: PHART, KXI2ABA, PO2ART]    Contraindications to home sleep test: none    Assessment and Plan:    1. Excessive daytime sleepiness- New (to me), additional work-up planned (4)  1. Check HST  2. Sleepy driving tips   3. RTC in 2 weeks with study results   2.   Snoring- New to me, additional work-up planned    1.   As above   3.   Restless leg syndrome/PLMD New (to me), additional work-up planned (4)  1. Address  after HST   2. Non-pharmacological treatment methods   4.   Insomnia - sleep onset and or maintenance New (to me), additional work-up planned (4)    1.  On trazodone   2.  Good sleep hygiene       I obtained a brief history from the patient, reviewed the medical problems and current medications, and made medical decisions regarding treatment based on that information.   I spent 25 minutes caring for Swapna on this date of service. This time includes time spent by me in the following activities: preparing for the visit, obtaining and/or reviewing a separately obtained history, performing a medically appropriate examination and/or evaluation, counseling and educating the patient/family/caregiver, ordering medications, tests, or procedures and documenting information in the medical record. I answered all of the patient's questions and she verbalized understanding. Discussion involved sleep apnea, effects of untreated sleep apnea, good sleep hygiene, sleep study.            RTC 2 weeks after study for results.             This document has been electronically signed by SIS Parsons on July 2, 2021 13:04 CDT          This document has been electronically signed by SIS Parsons on July 2, 2021         CC: Adis Gould MD Moody, Leo James, MD

## 2021-07-08 ENCOUNTER — TELEPHONE (OUTPATIENT)
Dept: FAMILY MEDICINE CLINIC | Facility: CLINIC | Age: 46
End: 2021-07-08

## 2021-07-09 ENCOUNTER — OFFICE VISIT (OUTPATIENT)
Dept: FAMILY MEDICINE CLINIC | Facility: CLINIC | Age: 46
End: 2021-07-09

## 2021-07-09 VITALS
OXYGEN SATURATION: 98 % | BODY MASS INDEX: 26.99 KG/M2 | HEART RATE: 96 BPM | DIASTOLIC BLOOD PRESSURE: 76 MMHG | WEIGHT: 162 LBS | HEIGHT: 65 IN | RESPIRATION RATE: 18 BRPM | SYSTOLIC BLOOD PRESSURE: 138 MMHG

## 2021-07-09 DIAGNOSIS — G43.109 MIGRAINE WITH AURA AND WITHOUT STATUS MIGRAINOSUS, NOT INTRACTABLE: ICD-10-CM

## 2021-07-09 PROCEDURE — 99213 OFFICE O/P EST LOW 20 MIN: CPT | Performed by: STUDENT IN AN ORGANIZED HEALTH CARE EDUCATION/TRAINING PROGRAM

## 2021-07-09 RX ORDER — LATANOPROST 50 UG/ML
SOLUTION/ DROPS OPHTHALMIC
COMMUNITY
Start: 2021-06-30 | End: 2022-04-21 | Stop reason: SDUPTHER

## 2021-07-09 RX ORDER — IBUPROFEN 600 MG/1
600 TABLET ORAL EVERY 8 HOURS PRN
Qty: 90 TABLET | Refills: 1 | Status: SHIPPED | OUTPATIENT
Start: 2021-07-09 | End: 2021-12-13 | Stop reason: SDUPTHER

## 2021-07-09 RX ORDER — RIMEGEPANT SULFATE 75 MG/75MG
1 TABLET, ORALLY DISINTEGRATING ORAL DAILY PRN
Qty: 8 TABLET | Refills: 1 | Status: SHIPPED | OUTPATIENT
Start: 2021-07-09 | End: 2021-07-09 | Stop reason: SDUPTHER

## 2021-07-09 RX ORDER — RIMEGEPANT SULFATE 75 MG/75MG
1 TABLET, ORALLY DISINTEGRATING ORAL DAILY PRN
Qty: 8 TABLET | Refills: 3 | Status: SHIPPED | OUTPATIENT
Start: 2021-07-09 | End: 2021-10-04 | Stop reason: SDUPTHER

## 2021-07-09 NOTE — PROGRESS NOTES
"Subjective:  Swapna Schaffer is a 45 y.o. female who presents for     Migraines; States still getting approximately 4 migraines a month, tried sample of Nurtec, tolerated well, did provide good relief, would like to continue.  Did not receive any from pharmacy previous visit.  Additionally takes Tylenol and ibuprofen over-the-counter as needed.  Does not wake from sleep, no numbness, tingling, weakness, vision changes.      Patient Active Problem List   Diagnosis   • Menometrorrhagia   • Surgical follow-up care   • Anxiety   • Neck pain   • Nonintractable headache   • Hypertension   • Hyperparathyroidism (CMS/HCC)   • Atopic rhinitis   • Weight loss   • Cigarette smoker   • Erythema nodosum   • Gastroesophageal reflux disease   • Vitamin D deficiency     Vitals:    Vitals:    07/09/21 1531   BP: 138/76   Pulse: 96   Resp: 18   SpO2: 98%   Weight: 73.5 kg (162 lb)   Height: 165.1 cm (65\")     Body mass index is 26.96 kg/m².      Current Outpatient Medications:   •  albuterol sulfate  (90 Base) MCG/ACT inhaler, Inhale 2 puffs Every 4 (Four) Hours As Needed for Wheezing., Disp: 18 g, Rfl: 3  •  cyclobenzaprine (FLEXERIL) 5 MG tablet, Take 1 tablet by mouth 2 (Two) Times a Day As Needed for Muscle Spasms., Disp: 30 tablet, Rfl: 3  •  cyproheptadine (PERIACTIN) 4 MG tablet, TAKE 1 TABLET BY MOUTH TWICE DAILY AS NEEDED FOR ALLERGIES, Disp: 60 tablet, Rfl: 4  •  diphenhydrAMINE (Banophen) 25 mg capsule, Take 1 capsule by mouth Daily., Disp: 120 capsule, Rfl: 4  •  estradiol (ESTRACE) 0.5 MG tablet, Take 1 tablet by mouth Daily., Disp: 30 tablet, Rfl: 3  •  fluticasone (FLONASE) 50 MCG/ACT nasal spray, 2 sprays into the nostril(s) as directed by provider Daily., Disp: 18.2 mL, Rfl: 3  •  loratadine-pseudoephedrine (CLARITIN-D 24 HOUR)  MG per 24 hr tablet, Take 1 tablet by mouth Daily., Disp: 20 tablet, Rfl: 0  •  losartan (COZAAR) 50 MG tablet, Take 1 tablet by mouth Daily., Disp: 30 tablet, Rfl: " 3  •  medroxyPROGESTERone (Provera) 5 MG tablet, Take 1 tablet by mouth Daily., Disp: 30 tablet, Rfl: 3  •  traZODone (DESYREL) 50 MG tablet, One or two q hs for anxiety and insomnia, Disp: 60 tablet, Rfl: 3  •  ibuprofen (ADVIL,MOTRIN) 600 MG tablet, Take 1 tablet by mouth Every 8 (Eight) Hours As Needed for Moderate Pain ., Disp: 90 tablet, Rfl: 1  •  latanoprost (XALATAN) 0.005 % ophthalmic solution, , Disp: , Rfl:   •  Rimegepant Sulfate (Nurtec) 75 MG tablet dispersible tablet, Take 1 tablet by mouth Daily As Needed (migraine)., Disp: 8 tablet, Rfl: 3    Patient Active Problem List   Diagnosis   • Menometrorrhagia   • Surgical follow-up care   • Anxiety   • Neck pain   • Nonintractable headache   • Hypertension   • Hyperparathyroidism (CMS/HCC)   • Atopic rhinitis   • Weight loss   • Cigarette smoker   • Erythema nodosum   • Gastroesophageal reflux disease   • Vitamin D deficiency     Past Surgical History:   Procedure Laterality Date   • HERNIA REPAIR      Umbilical hernia repair   • INJECTION OF MEDICATION  02/20/2016    Celestone (betamethasone) (1)  -  RICKI Perez   • OTHER SURGICAL HISTORY      Remove tendon sheath lesion 78282 (1)  - Ganglion cyst of the left wrist   • TUBAL ABDOMINAL LIGATION       Social History     Socioeconomic History   • Marital status:      Spouse name: Not on file   • Number of children: Not on file   • Years of education: Not on file   • Highest education level: Not on file   Tobacco Use   • Smoking status: Current Every Day Smoker     Packs/day: 1.00     Years: 15.00     Pack years: 15.00     Types: Cigarettes   • Smokeless tobacco: Never Used   • Tobacco comment: 10-19 cigs/day   Vaping Use   • Vaping Use: Never used   Substance and Sexual Activity   • Alcohol use: No   • Drug use: No   • Sexual activity: Yes     Family History   Problem Relation Age of Onset   • Diabetes Other    • Hypertension Other    • Stroke Other    • Thyroid disease Other      Office Visit on  06/25/2021   Component Date Value Ref Range Status   • Glucose 06/25/2021 71  65 - 99 mg/dL Final   • BUN 06/25/2021 13  6 - 20 mg/dL Final   • Creatinine 06/25/2021 0.77  0.57 - 1.00 mg/dL Final   • Sodium 06/25/2021 136  136 - 145 mmol/L Final   • Potassium 06/25/2021 4.2  3.5 - 5.2 mmol/L Final   • Chloride 06/25/2021 105  98 - 107 mmol/L Final   • CO2 06/25/2021 22.2  22.0 - 29.0 mmol/L Final   • Calcium 06/25/2021 10.6* 8.6 - 10.5 mg/dL Final   • Total Protein 06/25/2021 6.8  6.0 - 8.5 g/dL Final   • Albumin 06/25/2021 4.30  3.50 - 5.20 g/dL Final   • ALT (SGPT) 06/25/2021 46* 1 - 33 U/L Final   • AST (SGOT) 06/25/2021 30  1 - 32 U/L Final   • Alkaline Phosphatase 06/25/2021 87  39 - 117 U/L Final   • Total Bilirubin 06/25/2021 0.3  0.0 - 1.2 mg/dL Final   • eGFR   Amer 06/25/2021 97  >60 mL/min/1.73 Final   • Globulin 06/25/2021 2.5  gm/dL Final   • A/G Ratio 06/25/2021 1.7  g/dL Final   • BUN/Creatinine Ratio 06/25/2021 16.9  7.0 - 25.0 Final   • Anion Gap 06/25/2021 8.8  5.0 - 15.0 mmol/L Final   • TSH 06/25/2021 0.913  0.270 - 4.200 uIU/mL Final   • Vitamin B-12 06/25/2021 255  211 - 946 pg/mL Final   • Folate 06/25/2021 3.00* 4.78 - 24.20 ng/mL Final   • Hepatitis C Ab 06/25/2021 <0.1  0.0 - 0.9 s/co ratio Final   • Ferritin 06/25/2021 88.30  13.00 - 150.00 ng/mL Final   • Iron 06/25/2021 106  37 - 145 mcg/dL Final   • Iron Saturation 06/25/2021 30  20 - 50 % Final   • Transferrin 06/25/2021 234  200 - 360 mg/dL Final   • TIBC 06/25/2021 349  298 - 536 mcg/dL Final   • WBC 06/25/2021 5.20  3.40 - 10.80 10*3/mm3 Final   • RBC 06/25/2021 4.87  3.77 - 5.28 10*6/mm3 Final   • Hemoglobin 06/25/2021 14.0  12.0 - 15.9 g/dL Final   • Hematocrit 06/25/2021 43.7  34.0 - 46.6 % Final   • MCV 06/25/2021 89.7  79.0 - 97.0 fL Final   • MCH 06/25/2021 28.7  26.6 - 33.0 pg Final   • MCHC 06/25/2021 32.0  31.5 - 35.7 g/dL Final   • RDW 06/25/2021 13.2  12.3 - 15.4 % Final   • RDW-SD 06/25/2021 43.8  37.0 - 54.0 fl  Final   • MPV 06/25/2021 11.3  6.0 - 12.0 fL Final   • Platelets 06/25/2021 351  140 - 450 10*3/mm3 Final   • Neutrophil % 06/25/2021 49.0  42.7 - 76.0 % Final   • Lymphocyte % 06/25/2021 41.9  19.6 - 45.3 % Final   • Monocyte % 06/25/2021 8.5  5.0 - 12.0 % Final   • Eosinophil % 06/25/2021 0.0* 0.3 - 6.2 % Final   • Basophil % 06/25/2021 0.4  0.0 - 1.5 % Final   • Immature Grans % 06/25/2021 0.2  0.0 - 0.5 % Final   • Neutrophils, Absolute 06/25/2021 2.55  1.70 - 7.00 10*3/mm3 Final   • Lymphocytes, Absolute 06/25/2021 2.18  0.70 - 3.10 10*3/mm3 Final   • Monocytes, Absolute 06/25/2021 0.44  0.10 - 0.90 10*3/mm3 Final   • Eosinophils, Absolute 06/25/2021 0.00  0.00 - 0.40 10*3/mm3 Final   • Basophils, Absolute 06/25/2021 0.02  0.00 - 0.20 10*3/mm3 Final   • Immature Grans, Absolute 06/25/2021 0.01  0.00 - 0.05 10*3/mm3 Final   • nRBC 06/25/2021 0.0  0.0 - 0.2 /100 WBC Final   • Interpretation 06/25/2021 Comment   Final    Negative  Not infected with HCV, unless recent infection is suspected or other  evidence exists to indicate HCV infection.   Office Visit on 03/23/2021   Component Date Value Ref Range Status   • APOLLO Direct 03/23/2021 Negative  Negative Final   • Rheumatoid Factor Quantitative 03/23/2021 <10.0  0.0 - 14.0 IU/mL Final      XR Shoulder 2+ View Right (In Office)  Narrative: EXAM:  Radiograph(s), Osseous         REGION:   Shoulder    SIDE:     Right     VIEWS:   3             INDICATION:    right shoulder pain, M25.511 Pain in right  shoulder     COMPARISON:    none              FINDINGS:           No evidence of a fracture or bony malalignment.     No evidence of osteolytic/osteoblastic disease.                                 .      Impression: CONCLUSION:           1. No radiographic evidence of acute osseous pathology.              Note:  if pain or symptoms persist beyond reasonable expectations  and follow-up imaging is anticipated,  cross sectional imaging  (MRI) is suggested, as is deemed  clinically appropriate.                    Electronically signed by:  YOEL Gonzalez MD  5/17/2019 3:37  PM CDT Workstation: [id number]      @[employer]@    There is no immunization history on file for this patient.  The following portions of the patient's history were reviewed and updated as appropriate: allergies, current medications, past family history, past medical history, past social history, past surgical history and problem list.    PHQ-9 Total Score:           Physical Exam  Constitutional:       Appearance: Normal appearance.   HENT:      Head: Normocephalic and atraumatic.      Right Ear: External ear normal.      Left Ear: External ear normal.   Eyes:      General:         Right eye: No discharge.         Left eye: No discharge.      Conjunctiva/sclera: Conjunctivae normal.   Cardiovascular:      Rate and Rhythm: Normal rate and regular rhythm.      Pulses: Normal pulses.      Heart sounds: Normal heart sounds. No murmur heard.     Pulmonary:      Effort: Pulmonary effort is normal. No respiratory distress.      Breath sounds: Normal breath sounds.   Abdominal:      General: There is no distension.      Palpations: Abdomen is soft.      Tenderness: There is no abdominal tenderness.   Musculoskeletal:      Cervical back: Normal range of motion.      Right lower leg: No edema.      Left lower leg: No edema.   Lymphadenopathy:      Cervical: No cervical adenopathy.   Neurological:      General: No focal deficit present.      Mental Status: She is alert. Mental status is at baseline.      Cranial Nerves: No cranial nerve deficit.   Psychiatric:         Mood and Affect: Mood normal.         Behavior: Behavior normal.         Assessment/Plan    Diagnosis Plan   1. Migraine with aura and without status migrainosus, not intractable  Rimegepant Sulfate (Nurtec) 75 MG tablet dispersible tablet      No orders of the defined types were placed in this encounter.    Migraine; MRI pending, doing well with Nurtec,  will prescribe, no adverse effects, tolerating well.  No red flags on exam, reported history, reassuring.  Consider maintenance therapy at follow-up versus neurology referral.          This document has been electronically signed by Adis Gould MD on July 9, 2021 16:29 CDT

## 2021-08-06 ENCOUNTER — TELEPHONE (OUTPATIENT)
Dept: FAMILY MEDICINE CLINIC | Facility: CLINIC | Age: 46
End: 2021-08-06

## 2021-08-09 ENCOUNTER — OFFICE VISIT (OUTPATIENT)
Dept: FAMILY MEDICINE CLINIC | Facility: CLINIC | Age: 46
End: 2021-08-09

## 2021-08-09 VITALS
HEART RATE: 86 BPM | WEIGHT: 163.8 LBS | DIASTOLIC BLOOD PRESSURE: 80 MMHG | BODY MASS INDEX: 27.29 KG/M2 | SYSTOLIC BLOOD PRESSURE: 117 MMHG | OXYGEN SATURATION: 99 % | TEMPERATURE: 97.3 F | HEIGHT: 65 IN

## 2021-08-09 DIAGNOSIS — R74.01 ELEVATED ALT MEASUREMENT: ICD-10-CM

## 2021-08-09 DIAGNOSIS — J45.909 UNCOMPLICATED ASTHMA, UNSPECIFIED ASTHMA SEVERITY, UNSPECIFIED WHETHER PERSISTENT: ICD-10-CM

## 2021-08-09 DIAGNOSIS — Z71.6 ENCOUNTER FOR SMOKING CESSATION COUNSELING: ICD-10-CM

## 2021-08-09 DIAGNOSIS — M54.2 NECK PAIN: ICD-10-CM

## 2021-08-09 DIAGNOSIS — J30.2 SEASONAL ALLERGIC RHINITIS, UNSPECIFIED TRIGGER: ICD-10-CM

## 2021-08-09 DIAGNOSIS — E53.8 LOW FOLATE: Primary | ICD-10-CM

## 2021-08-09 DIAGNOSIS — Z12.11 ENCOUNTER FOR SCREENING FOR MALIGNANT NEOPLASM OF COLON: ICD-10-CM

## 2021-08-09 DIAGNOSIS — J30.2 SEASONAL ALLERGIC RHINITIS: ICD-10-CM

## 2021-08-09 PROCEDURE — 99214 OFFICE O/P EST MOD 30 MIN: CPT | Performed by: STUDENT IN AN ORGANIZED HEALTH CARE EDUCATION/TRAINING PROGRAM

## 2021-08-09 RX ORDER — CYCLOBENZAPRINE HCL 5 MG
5 TABLET ORAL 2 TIMES DAILY PRN
Qty: 90 TABLET | Refills: 3 | Status: SHIPPED | OUTPATIENT
Start: 2021-08-09 | End: 2021-10-04 | Stop reason: SDUPTHER

## 2021-08-09 RX ORDER — FLUTICASONE PROPIONATE 50 MCG
2 SPRAY, SUSPENSION (ML) NASAL DAILY
Qty: 18.2 ML | Refills: 3 | Status: SHIPPED | OUTPATIENT
Start: 2021-08-09 | End: 2022-04-21 | Stop reason: SDUPTHER

## 2021-08-09 RX ORDER — ALBUTEROL SULFATE 90 UG/1
2 AEROSOL, METERED RESPIRATORY (INHALATION) EVERY 4 HOURS PRN
Qty: 18 G | Refills: 3 | Status: SHIPPED | OUTPATIENT
Start: 2021-08-09 | End: 2021-12-13 | Stop reason: SDUPTHER

## 2021-08-09 RX ORDER — VARENICLINE TARTRATE 1 MG/1
1 TABLET, FILM COATED ORAL 2 TIMES DAILY
Qty: 56 TABLET | Refills: 1 | Status: SHIPPED | OUTPATIENT
Start: 2021-09-06 | End: 2021-10-04 | Stop reason: RX

## 2021-08-09 RX ORDER — DIPHENHYDRAMINE HCL 25 MG
25 CAPSULE ORAL DAILY
Qty: 120 CAPSULE | Refills: 4 | Status: SHIPPED | OUTPATIENT
Start: 2021-08-09 | End: 2021-10-04 | Stop reason: SDUPTHER

## 2021-08-09 RX ORDER — LORATADINE AND PSEUDOEPHEDRINE SULFATE 10; 240 MG/1; MG/1
1 TABLET, EXTENDED RELEASE ORAL DAILY
Qty: 90 TABLET | Refills: 1 | Status: SHIPPED | OUTPATIENT
Start: 2021-08-09 | End: 2022-04-21 | Stop reason: SDUPTHER

## 2021-08-11 ENCOUNTER — TELEPHONE (OUTPATIENT)
Dept: FAMILY MEDICINE CLINIC | Facility: CLINIC | Age: 46
End: 2021-08-11

## 2021-08-11 ENCOUNTER — APPOINTMENT (OUTPATIENT)
Dept: SLEEP MEDICINE | Facility: HOSPITAL | Age: 46
End: 2021-08-11

## 2021-08-11 ENCOUNTER — LAB (OUTPATIENT)
Dept: LAB | Facility: HOSPITAL | Age: 46
End: 2021-08-11

## 2021-08-11 LAB
ALBUMIN SERPL-MCNC: 4 G/DL (ref 3.5–5.2)
ALBUMIN/GLOB SERPL: 1.3 G/DL
ALP SERPL-CCNC: 105 U/L (ref 39–117)
ALT SERPL W P-5'-P-CCNC: 34 U/L (ref 1–33)
ANION GAP SERPL CALCULATED.3IONS-SCNC: 10.9 MMOL/L (ref 5–15)
AST SERPL-CCNC: 25 U/L (ref 1–32)
BILIRUB SERPL-MCNC: 0.4 MG/DL (ref 0–1.2)
BUN SERPL-MCNC: 11 MG/DL (ref 6–20)
BUN/CREAT SERPL: 14.9 (ref 7–25)
CALCIUM SPEC-SCNC: 10.7 MG/DL (ref 8.6–10.5)
CHLORIDE SERPL-SCNC: 106 MMOL/L (ref 98–107)
CO2 SERPL-SCNC: 21.1 MMOL/L (ref 22–29)
CREAT SERPL-MCNC: 0.74 MG/DL (ref 0.57–1)
FOLATE SERPL-MCNC: 2.32 NG/ML (ref 4.78–24.2)
GFR SERPL CREATININE-BSD FRML MDRD: 102 ML/MIN/1.73
GLOBULIN UR ELPH-MCNC: 3 GM/DL
GLUCOSE SERPL-MCNC: 99 MG/DL (ref 65–99)
HCYS SERPL-MCNC: 19.4 UMOL/L (ref 0–15)
POTASSIUM SERPL-SCNC: 4.3 MMOL/L (ref 3.5–5.2)
PROT SERPL-MCNC: 7 G/DL (ref 6–8.5)
SODIUM SERPL-SCNC: 138 MMOL/L (ref 136–145)
VIT B12 BLD-MCNC: 211 PG/ML (ref 211–946)

## 2021-08-11 PROCEDURE — 82607 VITAMIN B-12: CPT | Performed by: STUDENT IN AN ORGANIZED HEALTH CARE EDUCATION/TRAINING PROGRAM

## 2021-08-11 PROCEDURE — 83090 ASSAY OF HOMOCYSTEINE: CPT | Performed by: STUDENT IN AN ORGANIZED HEALTH CARE EDUCATION/TRAINING PROGRAM

## 2021-08-11 PROCEDURE — 83921 ORGANIC ACID SINGLE QUANT: CPT | Performed by: STUDENT IN AN ORGANIZED HEALTH CARE EDUCATION/TRAINING PROGRAM

## 2021-08-11 PROCEDURE — 80053 COMPREHEN METABOLIC PANEL: CPT | Performed by: STUDENT IN AN ORGANIZED HEALTH CARE EDUCATION/TRAINING PROGRAM

## 2021-08-11 PROCEDURE — 82746 ASSAY OF FOLIC ACID SERUM: CPT | Performed by: STUDENT IN AN ORGANIZED HEALTH CARE EDUCATION/TRAINING PROGRAM

## 2021-08-15 LAB
Lab: NORMAL
METHYLMALONATE SERPL-SCNC: 119 NMOL/L (ref 0–378)

## 2021-08-16 ENCOUNTER — HOSPITAL ENCOUNTER (OUTPATIENT)
Dept: SLEEP MEDICINE | Facility: HOSPITAL | Age: 46
Discharge: HOME | End: 2021-08-16
Admitting: NURSE PRACTITIONER

## 2021-08-16 DIAGNOSIS — R06.83 SNORING: ICD-10-CM

## 2021-08-16 DIAGNOSIS — G47.19 EXCESSIVE DAYTIME SLEEPINESS: ICD-10-CM

## 2021-08-16 DIAGNOSIS — E53.8 FOLATE DEFICIENCY: Primary | ICD-10-CM

## 2021-08-16 PROCEDURE — 95800 SLP STDY UNATTENDED: CPT

## 2021-08-16 PROCEDURE — 95800 SLP STDY UNATTENDED: CPT | Performed by: INTERNAL MEDICINE

## 2021-08-16 RX ORDER — FOLIC ACID 1 MG/1
1 TABLET ORAL DAILY
Qty: 90 TABLET | Refills: 1 | Status: SHIPPED | OUTPATIENT
Start: 2021-08-16 | End: 2021-10-04 | Stop reason: SDUPTHER

## 2021-08-17 ENCOUNTER — TELEPHONE (OUTPATIENT)
Dept: FAMILY MEDICINE CLINIC | Facility: CLINIC | Age: 46
End: 2021-08-17

## 2021-08-17 NOTE — TELEPHONE ENCOUNTER
----- Message from Adis Gould MD sent at 8/16/2021  7:07 PM CDT -----  Labs significant for folate deficiency, as such, recommend supplementation with folic acid, 1 mg daily.  It is unclear what has caused your folate deficiency, can be seen in individuals with strict diet, avoidance of foods with fortified folate but can also be seen in certain gastrointestinal disease such as celiac disease, celiac sprue.  We will recheck levels at follow-up to make sure supplementation is working and that there is no underlying malabsorption causing this issue.

## 2021-08-25 DIAGNOSIS — Z71.6 ENCOUNTER FOR SMOKING CESSATION COUNSELING: Primary | ICD-10-CM

## 2021-08-25 RX ORDER — BUPROPION HYDROCHLORIDE 150 MG/1
TABLET, EXTENDED RELEASE ORAL
Qty: 60 TABLET | Refills: 2 | Status: SHIPPED | OUTPATIENT
Start: 2021-08-25 | End: 2021-10-04 | Stop reason: SDUPTHER

## 2021-09-22 DIAGNOSIS — G25.81 RLS (RESTLESS LEGS SYNDROME): Primary | ICD-10-CM

## 2021-09-22 RX ORDER — PRAMIPEXOLE DIHYDROCHLORIDE 0.12 MG/1
TABLET ORAL
Qty: 30 TABLET | Refills: 0 | Status: SHIPPED | OUTPATIENT
Start: 2021-09-22 | End: 2021-11-04 | Stop reason: SDUPTHER

## 2021-10-01 ENCOUNTER — TELEPHONE (OUTPATIENT)
Dept: FAMILY MEDICINE CLINIC | Facility: CLINIC | Age: 46
End: 2021-10-01

## 2021-10-04 ENCOUNTER — OFFICE VISIT (OUTPATIENT)
Dept: FAMILY MEDICINE CLINIC | Facility: CLINIC | Age: 46
End: 2021-10-04

## 2021-10-04 VITALS
TEMPERATURE: 98 F | HEART RATE: 97 BPM | OXYGEN SATURATION: 99 % | BODY MASS INDEX: 26.99 KG/M2 | WEIGHT: 162 LBS | HEIGHT: 65 IN | DIASTOLIC BLOOD PRESSURE: 72 MMHG | SYSTOLIC BLOOD PRESSURE: 114 MMHG

## 2021-10-04 DIAGNOSIS — G47.00 INSOMNIA, UNSPECIFIED TYPE: ICD-10-CM

## 2021-10-04 DIAGNOSIS — R61 NIGHT SWEATS: ICD-10-CM

## 2021-10-04 DIAGNOSIS — Z23 FLU VACCINE NEED: Primary | ICD-10-CM

## 2021-10-04 DIAGNOSIS — G25.81 RLS (RESTLESS LEGS SYNDROME): ICD-10-CM

## 2021-10-04 DIAGNOSIS — J30.2 SEASONAL ALLERGIC RHINITIS: ICD-10-CM

## 2021-10-04 DIAGNOSIS — M54.2 NECK PAIN: ICD-10-CM

## 2021-10-04 DIAGNOSIS — G43.109 MIGRAINE WITH AURA AND WITHOUT STATUS MIGRAINOSUS, NOT INTRACTABLE: ICD-10-CM

## 2021-10-04 DIAGNOSIS — F41.9 ANXIETY: ICD-10-CM

## 2021-10-04 DIAGNOSIS — Z71.6 ENCOUNTER FOR SMOKING CESSATION COUNSELING: ICD-10-CM

## 2021-10-04 DIAGNOSIS — E53.8 FOLATE DEFICIENCY: ICD-10-CM

## 2021-10-04 PROCEDURE — 99214 OFFICE O/P EST MOD 30 MIN: CPT | Performed by: STUDENT IN AN ORGANIZED HEALTH CARE EDUCATION/TRAINING PROGRAM

## 2021-10-04 PROCEDURE — 90471 IMMUNIZATION ADMIN: CPT | Performed by: STUDENT IN AN ORGANIZED HEALTH CARE EDUCATION/TRAINING PROGRAM

## 2021-10-04 PROCEDURE — 90686 IIV4 VACC NO PRSV 0.5 ML IM: CPT | Performed by: STUDENT IN AN ORGANIZED HEALTH CARE EDUCATION/TRAINING PROGRAM

## 2021-10-04 RX ORDER — CYCLOBENZAPRINE HCL 5 MG
5 TABLET ORAL 2 TIMES DAILY PRN
Qty: 90 TABLET | Refills: 3 | Status: SHIPPED | OUTPATIENT
Start: 2021-10-04 | End: 2022-08-15 | Stop reason: SDUPTHER

## 2021-10-04 RX ORDER — TRAZODONE HYDROCHLORIDE 50 MG/1
TABLET ORAL
Qty: 60 TABLET | Refills: 3 | Status: SHIPPED | OUTPATIENT
Start: 2021-10-04 | End: 2021-12-13 | Stop reason: SDUPTHER

## 2021-10-04 RX ORDER — RIMEGEPANT SULFATE 75 MG/75MG
1 TABLET, ORALLY DISINTEGRATING ORAL DAILY PRN
Qty: 8 TABLET | Refills: 6 | Status: SHIPPED | OUTPATIENT
Start: 2021-10-04 | End: 2021-11-16

## 2021-10-04 RX ORDER — FOLIC ACID 1 MG/1
1 TABLET ORAL DAILY
Qty: 90 TABLET | Refills: 1 | Status: SHIPPED | OUTPATIENT
Start: 2021-10-04 | End: 2022-04-21

## 2021-10-04 RX ORDER — ESTRADIOL 0.5 MG/1
0.5 TABLET ORAL DAILY
Qty: 30 TABLET | Refills: 3 | Status: SHIPPED | OUTPATIENT
Start: 2021-10-04 | End: 2021-12-13 | Stop reason: SDUPTHER

## 2021-10-04 RX ORDER — BUPROPION HYDROCHLORIDE 150 MG/1
TABLET, EXTENDED RELEASE ORAL
Qty: 60 TABLET | Refills: 2 | Status: SHIPPED | OUTPATIENT
Start: 2021-10-04 | End: 2022-04-21

## 2021-10-04 RX ORDER — DIPHENHYDRAMINE HCL 25 MG
25 CAPSULE ORAL DAILY
Qty: 120 CAPSULE | Refills: 4 | Status: SHIPPED | OUTPATIENT
Start: 2021-10-04 | End: 2022-10-19

## 2021-10-04 RX ORDER — MEDROXYPROGESTERONE ACETATE 5 MG/1
5 TABLET ORAL DAILY
Qty: 30 TABLET | Refills: 3 | Status: SHIPPED | OUTPATIENT
Start: 2021-10-04 | End: 2021-12-13 | Stop reason: SDUPTHER

## 2021-10-04 NOTE — PROGRESS NOTES
Answers for HPI/ROS submitted by the patient on 10/3/2021  Please describe your symptoms.: Excruciating pain in left leg that wakes me up out of my sleep every single night. As long as I'm up moving around in fine but as soon as I lay down it starts and gets worse.  Have you had these symptoms before?: Yes  How long have you been having these symptoms?: Greater than 2 weeks  Please list any medications you are currently taking for this condition.: Flexeril  and Ibuprofen 600mg and that's not working at all. I even take Tylenol.  Please describe any probable cause for these symptoms. : Don't know why it's happening.  What is the primary reason for your visit?: Other    Subjective:  Swapna Schaffer is a 46 y.o. female who presents for Medication refills, left leg pain    Left leg pain; states occurs when she lays down, or relaxes. Wakes up from sleep, predominately back of calf and thigh. Denied trauma, swelling, numbness, tingling or weakness. Has had gabapentin in the past for carpal tunnel in the past but did not like the way it made her feel. Of note, patient did call in to the clinic with similar complaint on 9/22, medication was called on but patient did not .    Medication refill; needs Wellbutrin refilled for smoking cessation, currently smoking half pack per day, denies issue depression, anxiety, hypertension.  Additionally needs allergy medications, hormone replacement therapy, trazodone for sleep, Nurtec for migraines, folic acid supplementation for folic acid deficiency.  States tolerating medications well, no adverse effects, providing good relief.      Patient Active Problem List   Diagnosis   • Menometrorrhagia   • Surgical follow-up care   • Anxiety   • Neck pain   • Nonintractable headache   • Hypertension   • Hyperparathyroidism (HCC)   • Atopic rhinitis   • Weight loss   • Cigarette smoker   • Erythema nodosum   • Gastroesophageal reflux disease   • Vitamin D deficiency  "    Vitals:    Vitals:    10/04/21 0808   BP: 114/72   BP Location: Right arm   Patient Position: Sitting   Cuff Size: Adult   Pulse: 97   Temp: 98 °F (36.7 °C)   SpO2: 99%   Weight: 73.5 kg (162 lb)   Height: 165.1 cm (65\")     Body mass index is 26.96 kg/m².      Current Outpatient Medications:   •  albuterol sulfate  (90 Base) MCG/ACT inhaler, Inhale 2 puffs Every 4 (Four) Hours As Needed for Wheezing., Disp: 18 g, Rfl: 3  •  buPROPion SR (Wellbutrin SR) 150 MG 12 hr tablet, 150 mg once daily for 3 days; increase to 150 mg twice daily (maximum dose: 300 mg/day)., Disp: 60 tablet, Rfl: 2  •  cyclobenzaprine (FLEXERIL) 5 MG tablet, Take 1 tablet by mouth 2 (Two) Times a Day As Needed for Muscle Spasms., Disp: 90 tablet, Rfl: 3  •  cyproheptadine (PERIACTIN) 4 MG tablet, TAKE 1 TABLET BY MOUTH TWICE DAILY AS NEEDED FOR ALLERGIES, Disp: 60 tablet, Rfl: 4  •  diphenhydrAMINE (Banophen) 25 mg capsule, Take 1 capsule by mouth Daily., Disp: 120 capsule, Rfl: 4  •  estradiol (ESTRACE) 0.5 MG tablet, Take 1 tablet by mouth Daily., Disp: 30 tablet, Rfl: 3  •  fluticasone (Flonase) 50 MCG/ACT nasal spray, 2 sprays into the nostril(s) as directed by provider Daily., Disp: 18.2 mL, Rfl: 3  •  folic acid (FOLVITE) 1 MG tablet, Take 1 tablet by mouth Daily., Disp: 90 tablet, Rfl: 1  •  ibuprofen (ADVIL,MOTRIN) 600 MG tablet, Take 1 tablet by mouth Every 8 (Eight) Hours As Needed for Moderate Pain ., Disp: 90 tablet, Rfl: 1  •  latanoprost (XALATAN) 0.005 % ophthalmic solution, , Disp: , Rfl:   •  loratadine-pseudoephedrine (Claritin-D 24 Hour)  MG per 24 hr tablet, Take 1 tablet by mouth Daily., Disp: 90 tablet, Rfl: 1  •  losartan (COZAAR) 50 MG tablet, Take 1 tablet by mouth Daily., Disp: 30 tablet, Rfl: 3  •  medroxyPROGESTERone (Provera) 5 MG tablet, Take 1 tablet by mouth Daily., Disp: 30 tablet, Rfl: 3  •  pramipexole (Mirapex) 0.125 MG tablet, Take 1 tab nightly, may increase to 2-3 tablets nightly if well " tolerated after at least 4 days on previous dose and if needed., Disp: 30 tablet, Rfl: 0  •  Rimegepant Sulfate (Nurtec) 75 MG tablet dispersible tablet, Take 1 tablet by mouth Daily As Needed (migraine)., Disp: 8 tablet, Rfl: 6  •  traZODone (DESYREL) 50 MG tablet, One or two q hs for anxiety and insomnia, Disp: 60 tablet, Rfl: 3    Patient Active Problem List   Diagnosis   • Menometrorrhagia   • Surgical follow-up care   • Anxiety   • Neck pain   • Nonintractable headache   • Hypertension   • Hyperparathyroidism (HCC)   • Atopic rhinitis   • Weight loss   • Cigarette smoker   • Erythema nodosum   • Gastroesophageal reflux disease   • Vitamin D deficiency     Past Surgical History:   Procedure Laterality Date   • HERNIA REPAIR      Umbilical hernia repair   • INJECTION OF MEDICATION  02/20/2016    Celestone (betamethasone) (1)  -  RICKI Perez   • OTHER SURGICAL HISTORY      Remove tendon sheath lesion 55032 (1)  - Ganglion cyst of the left wrist   • TUBAL ABDOMINAL LIGATION       Social History     Socioeconomic History   • Marital status:      Spouse name: Not on file   • Number of children: Not on file   • Years of education: Not on file   • Highest education level: Not on file   Tobacco Use   • Smoking status: Current Every Day Smoker     Packs/day: 1.00     Years: 15.00     Pack years: 15.00     Types: Cigarettes   • Smokeless tobacco: Never Used   • Tobacco comment: 10-19 cigs/day   Vaping Use   • Vaping Use: Never used   Substance and Sexual Activity   • Alcohol use: No   • Drug use: No   • Sexual activity: Yes     Family History   Problem Relation Age of Onset   • Diabetes Other    • Hypertension Other    • Stroke Other    • Thyroid disease Other      Office Visit on 08/09/2021   Component Date Value Ref Range Status   • Methylmalonic Acid 08/11/2021 119  0 - 378 nmol/L Final   • Disclaimer: 08/11/2021 Comment   Final    This test was developed and its performance characteristics  determined by  [facility]. It has not been cleared or approved  by the Food and Drug Administration.   • Homocysteine, Plasma (Quant) 08/11/2021 19.4* 0.0 - 15.0 umol/L Final   • Vitamin B-12 08/11/2021 211  211 - 946 pg/mL Final   • Folate 08/11/2021 2.32* 4.78 - 24.20 ng/mL Final   • Glucose 08/11/2021 99  65 - 99 mg/dL Final   • BUN 08/11/2021 11  6 - 20 mg/dL Final   • Creatinine 08/11/2021 0.74  0.57 - 1.00 mg/dL Final   • Sodium 08/11/2021 138  136 - 145 mmol/L Final   • Potassium 08/11/2021 4.3  3.5 - 5.2 mmol/L Final   • Chloride 08/11/2021 106  98 - 107 mmol/L Final   • CO2 08/11/2021 21.1* 22.0 - 29.0 mmol/L Final   • Calcium 08/11/2021 10.7* 8.6 - 10.5 mg/dL Final   • Total Protein 08/11/2021 7.0  6.0 - 8.5 g/dL Final   • Albumin 08/11/2021 4.00  3.50 - 5.20 g/dL Final   • ALT (SGPT) 08/11/2021 34* 1 - 33 U/L Final   • AST (SGOT) 08/11/2021 25  1 - 32 U/L Final   • Alkaline Phosphatase 08/11/2021 105  39 - 117 U/L Final   • Total Bilirubin 08/11/2021 0.4  0.0 - 1.2 mg/dL Final   • eGFR   Amer 08/11/2021 102  >60 mL/min/1.73 Final   • Globulin 08/11/2021 3.0  gm/dL Final   • A/G Ratio 08/11/2021 1.3  g/dL Final   • BUN/Creatinine Ratio 08/11/2021 14.9  7.0 - 25.0 Final   • Anion Gap 08/11/2021 10.9  5.0 - 15.0 mmol/L Final   Office Visit on 06/25/2021   Component Date Value Ref Range Status   • Glucose 06/25/2021 71  65 - 99 mg/dL Final   • BUN 06/25/2021 13  6 - 20 mg/dL Final   • Creatinine 06/25/2021 0.77  0.57 - 1.00 mg/dL Final   • Sodium 06/25/2021 136  136 - 145 mmol/L Final   • Potassium 06/25/2021 4.2  3.5 - 5.2 mmol/L Final   • Chloride 06/25/2021 105  98 - 107 mmol/L Final   • CO2 06/25/2021 22.2  22.0 - 29.0 mmol/L Final   • Calcium 06/25/2021 10.6* 8.6 - 10.5 mg/dL Final   • Total Protein 06/25/2021 6.8  6.0 - 8.5 g/dL Final   • Albumin 06/25/2021 4.30  3.50 - 5.20 g/dL Final   • ALT (SGPT) 06/25/2021 46* 1 - 33 U/L Final   • AST (SGOT) 06/25/2021 30  1 - 32 U/L Final   • Alkaline Phosphatase 06/25/2021  87  39 - 117 U/L Final   • Total Bilirubin 06/25/2021 0.3  0.0 - 1.2 mg/dL Final   • eGFR   Amer 06/25/2021 97  >60 mL/min/1.73 Final   • Globulin 06/25/2021 2.5  gm/dL Final   • A/G Ratio 06/25/2021 1.7  g/dL Final   • BUN/Creatinine Ratio 06/25/2021 16.9  7.0 - 25.0 Final   • Anion Gap 06/25/2021 8.8  5.0 - 15.0 mmol/L Final   • TSH 06/25/2021 0.913  0.270 - 4.200 uIU/mL Final   • Vitamin B-12 06/25/2021 255  211 - 946 pg/mL Final   • Folate 06/25/2021 3.00* 4.78 - 24.20 ng/mL Final   • Hepatitis C Ab 06/25/2021 <0.1  0.0 - 0.9 s/co ratio Final   • Ferritin 06/25/2021 88.30  13.00 - 150.00 ng/mL Final   • Iron 06/25/2021 106  37 - 145 mcg/dL Final   • Iron Saturation 06/25/2021 30  20 - 50 % Final   • Transferrin 06/25/2021 234  200 - 360 mg/dL Final   • TIBC 06/25/2021 349  298 - 536 mcg/dL Final   • WBC 06/25/2021 5.20  3.40 - 10.80 10*3/mm3 Final   • RBC 06/25/2021 4.87  3.77 - 5.28 10*6/mm3 Final   • Hemoglobin 06/25/2021 14.0  12.0 - 15.9 g/dL Final   • Hematocrit 06/25/2021 43.7  34.0 - 46.6 % Final   • MCV 06/25/2021 89.7  79.0 - 97.0 fL Final   • MCH 06/25/2021 28.7  26.6 - 33.0 pg Final   • MCHC 06/25/2021 32.0  31.5 - 35.7 g/dL Final   • RDW 06/25/2021 13.2  12.3 - 15.4 % Final   • RDW-SD 06/25/2021 43.8  37.0 - 54.0 fl Final   • MPV 06/25/2021 11.3  6.0 - 12.0 fL Final   • Platelets 06/25/2021 351  140 - 450 10*3/mm3 Final   • Neutrophil % 06/25/2021 49.0  42.7 - 76.0 % Final   • Lymphocyte % 06/25/2021 41.9  19.6 - 45.3 % Final   • Monocyte % 06/25/2021 8.5  5.0 - 12.0 % Final   • Eosinophil % 06/25/2021 0.0* 0.3 - 6.2 % Final   • Basophil % 06/25/2021 0.4  0.0 - 1.5 % Final   • Immature Grans % 06/25/2021 0.2  0.0 - 0.5 % Final   • Neutrophils, Absolute 06/25/2021 2.55  1.70 - 7.00 10*3/mm3 Final   • Lymphocytes, Absolute 06/25/2021 2.18  0.70 - 3.10 10*3/mm3 Final   • Monocytes, Absolute 06/25/2021 0.44  0.10 - 0.90 10*3/mm3 Final   • Eosinophils, Absolute 06/25/2021 0.00  0.00 - 0.40 10*3/mm3  Final   • Basophils, Absolute 06/25/2021 0.02  0.00 - 0.20 10*3/mm3 Final   • Immature Grans, Absolute 06/25/2021 0.01  0.00 - 0.05 10*3/mm3 Final   • nRBC 06/25/2021 0.0  0.0 - 0.2 /100 WBC Final   • Interpretation 06/25/2021 Comment   Final    Negative  Not infected with HCV, unless recent infection is suspected or other  evidence exists to indicate HCV infection.      Home Sleep Study  [place] [address]  Phone: [phone]  Fax: [phone]    Home Sleep Study Interpretation    Patient's Name: Swapna Schaffer  YOB: 1975  Primary Care Physician: Adis Gould MD  Date of Study: 08/16/2021  Ordering Provider: Gail ALEGRE  Interpreting Physician: Daniel Seo MD.  Date of Interpretation: 8/18/2021    Indications/Narrative:  Patient is a 45 y.o. female with history of excessive daytime sleepiness,   at work sleepiness scale of 21, neck circumference 13 inches. This is a   technical adequate study.  I have personally reviewed the raw data and   summarized as below. Patient had a total recording time of 6 hours 24   minutes, total presumed sleep time of 4 hours and 55 minutes, mean oxygen   saturation 86% with a nery of 90%, mean pulse rate 89 bpm, no significant   respiratory events overall pAHI 1.1 events per hour, RDI 1.4 events per   hour, sleep latency 23 minutes, REM latency 92 minutes, snoring was mild.    Impression:  1. After review of this home sleep testing data, there is no evidence of   significant sleep disordered breathing, overall P AHI 1.1 events per hour    Recommendation:  1. If clinical suspicion of sleep disordered breathing is high recommend   in lab diagnostic PSG  2. Home sleep testing may underestimate diagnosis and severity of his   sleep apnea, it is based on presumed sleep time  3. Consider other possible etiologies of this patient symptoms including   but not limited to idiopathic  hypersomnia, insufficient sleep syndrome,   medications, undiagnosed sleep disordered breathing, poor sleep hygiene   practices.  4. Clinical correlation recommended.    Interpreted by:    Daniel Seo MD, FACP, FCCP  Diplomate in Pulmonary & Sleep Medicine    This document has been electronically signed by Daniel Seo MD on   August 18, 2021 14:05 CDT      EMR Dragon/Transcription disclaimer:   Some of this note may be an electronic transcription/translation of spoken   language to printed text. The electronic translation of spoken language   may permit erroneous, or at times, nonsensical words or phrases to be   inadvertently transcribed; Although I have reviewed the note for such   errors, some may still exist.      [unfilled]  Immunization History   Administered Date(s) Administered   • FluLaval/Fluarix >6 Months 10/04/2021     The following portions of the patient's history were reviewed and updated as appropriate: allergies, current medications, past family history, past medical history, past social history, past surgical history and problem list.    PHQ-9 Total Score:             Physical Exam  Constitutional:       Appearance: Normal appearance.   HENT:      Head: Normocephalic and atraumatic.      Right Ear: External ear normal.      Left Ear: External ear normal.   Eyes:      General:         Right eye: No discharge.         Left eye: No discharge.      Conjunctiva/sclera: Conjunctivae normal.   Cardiovascular:      Rate and Rhythm: Normal rate and regular rhythm.      Pulses: Normal pulses.      Heart sounds: Normal heart sounds. No murmur heard.     Pulmonary:      Effort: Pulmonary effort is normal. No respiratory distress.      Breath sounds: Normal breath sounds.   Abdominal:      General: There is no distension.      Palpations: Abdomen is soft.      Tenderness: There is no abdominal tenderness.   Musculoskeletal:         General: No swelling, tenderness or deformity.      Cervical  back: Normal range of motion.      Right lower leg: No edema.      Left lower leg: No edema.   Lymphadenopathy:      Cervical: No cervical adenopathy.   Neurological:      Mental Status: She is alert. Mental status is at baseline.   Psychiatric:         Mood and Affect: Mood normal.         Behavior: Behavior normal.       Assessment/Plan    Diagnosis Plan   1. Flu vaccine need  FluLaval/Fluarix >6 Months ([id number])   2. Folate deficiency  folic acid (FOLVITE) 1 MG tablet    Folate    Homocysteine    Methylmalonic Acid, Serum   3. Encounter for smoking cessation counseling  buPROPion SR (Wellbutrin SR) 150 MG 12 hr tablet   4. Neck pain  cyclobenzaprine (FLEXERIL) 5 MG tablet   5. Night sweats  estradiol (ESTRACE) 0.5 MG tablet    medroxyPROGESTERone (Provera) 5 MG tablet   6. Seasonal allergic rhinitis  diphenhydrAMINE (Banophen) 25 mg capsule   7. Anxiety  traZODone (DESYREL) 50 MG tablet   8. Insomnia, unspecified type  traZODone (DESYREL) 50 MG tablet   9. Migraine with aura and without status migrainosus, not intractable  Rimegepant Sulfate (Nurtec) 75 MG tablet dispersible tablet   10. RLS (restless legs syndrome)  Magnesium      Orders Placed This Encounter   Procedures   • FluLaval/Fluarix >6 Months (2919-1601)   • Folate     Order Specific Question:   Release to patient     Answer:   Immediate   • Homocysteine     Order Specific Question:   Release to patient     Answer:   Immediate   • Methylmalonic Acid, Serum     Order Specific Question:   Release to patient     Answer:   Immediate   • Magnesium     Order Specific Question:   Release to patient     Answer:   Immediate     Restless leg syndrome; will check magnesium as above, counseled on Mirapex, patient to start medication tonight, advised on possible adverse effects, benefits of medication, patient agreeable to plan.  Follow-up 1 month, sooner if needed.    Medication refills as above, tolerating well, no adverse effects, labs appropriate.    Folic  acid deficiency; likely due to diet, will check levels as above, adjust if needed, indicated.          This document has been electronically signed by Adis Gould MD on October 4, 2021 09:08 CDT

## 2021-10-05 ENCOUNTER — LAB (OUTPATIENT)
Dept: LAB | Facility: HOSPITAL | Age: 46
End: 2021-10-05

## 2021-10-05 PROCEDURE — 83735 ASSAY OF MAGNESIUM: CPT | Performed by: STUDENT IN AN ORGANIZED HEALTH CARE EDUCATION/TRAINING PROGRAM

## 2021-10-05 PROCEDURE — 83090 ASSAY OF HOMOCYSTEINE: CPT | Performed by: STUDENT IN AN ORGANIZED HEALTH CARE EDUCATION/TRAINING PROGRAM

## 2021-10-05 PROCEDURE — 83921 ORGANIC ACID SINGLE QUANT: CPT | Performed by: STUDENT IN AN ORGANIZED HEALTH CARE EDUCATION/TRAINING PROGRAM

## 2021-10-05 PROCEDURE — 82746 ASSAY OF FOLIC ACID SERUM: CPT | Performed by: STUDENT IN AN ORGANIZED HEALTH CARE EDUCATION/TRAINING PROGRAM

## 2021-10-06 LAB
FOLATE SERPL-MCNC: >20 NG/ML (ref 4.78–24.2)
HCYS SERPL-MCNC: 13.7 UMOL/L (ref 0–15)
MAGNESIUM SERPL-MCNC: 1.9 MG/DL (ref 1.6–2.6)

## 2021-10-10 LAB
Lab: NORMAL
METHYLMALONATE SERPL-SCNC: 170 NMOL/L (ref 0–378)

## 2021-10-11 NOTE — PROGRESS NOTES
Labs reassuring, no electrolyte abnormalities. Folate deficiency has resolved and you may stop supplementing. We will periodically retest to ensure resolution.

## 2021-11-04 ENCOUNTER — OFFICE VISIT (OUTPATIENT)
Dept: FAMILY MEDICINE CLINIC | Facility: CLINIC | Age: 46
End: 2021-11-04

## 2021-11-04 ENCOUNTER — TELEPHONE (OUTPATIENT)
Dept: FAMILY MEDICINE CLINIC | Facility: CLINIC | Age: 46
End: 2021-11-04

## 2021-11-04 VITALS
OXYGEN SATURATION: 98 % | BODY MASS INDEX: 27.16 KG/M2 | HEART RATE: 87 BPM | SYSTOLIC BLOOD PRESSURE: 122 MMHG | TEMPERATURE: 97.7 F | DIASTOLIC BLOOD PRESSURE: 82 MMHG | HEIGHT: 65 IN | WEIGHT: 163 LBS

## 2021-11-04 DIAGNOSIS — G25.81 RLS (RESTLESS LEGS SYNDROME): ICD-10-CM

## 2021-11-04 DIAGNOSIS — G43.109 MIGRAINE WITH AURA AND WITHOUT STATUS MIGRAINOSUS, NOT INTRACTABLE: Primary | ICD-10-CM

## 2021-11-04 PROCEDURE — 99214 OFFICE O/P EST MOD 30 MIN: CPT | Performed by: STUDENT IN AN ORGANIZED HEALTH CARE EDUCATION/TRAINING PROGRAM

## 2021-11-04 RX ORDER — ERENUMAB-AOOE 70 MG/ML
70 INJECTION SUBCUTANEOUS ONCE
Qty: 3 PEN | Refills: 2 | Status: SHIPPED | OUTPATIENT
Start: 2021-11-04 | End: 2021-11-04

## 2021-11-04 RX ORDER — PRAMIPEXOLE DIHYDROCHLORIDE 0.12 MG/1
TABLET ORAL
Qty: 90 TABLET | Refills: 0 | Status: SHIPPED | OUTPATIENT
Start: 2021-11-04 | End: 2021-12-13

## 2021-11-04 NOTE — PROGRESS NOTES
"Subjective:  Swapna Schaffer is a 46 y.o. female who presents for     Restless leg syndrome; appears fine, will start on Mirapex, tolerated medication well, no adverse effects.  States providing partial relief.  Additionally, labs reassuring for electrolyte abnormality, vitamin deficiency. States has increased dose to 0.25mg nightly.    Migraines;  has had more frequent episodes of migraines, believes it is due to her eyewear as she is awaiting new prescription this week. Has been taking Nurtec but unfortunately lately has not been working. On average is getting approximately 10+ migraines per month.  No nausea, vomiting, numbness, tingling, weakness.  No new characteristics of headache.       Patient Active Problem List   Diagnosis   • Menometrorrhagia   • Surgical follow-up care   • Anxiety   • Neck pain   • Nonintractable headache   • Hypertension   • Hyperparathyroidism (HCC)   • Atopic rhinitis   • Weight loss   • Cigarette smoker   • Erythema nodosum   • Gastroesophageal reflux disease   • Vitamin D deficiency     Vitals:    Vitals:    11/04/21 1306   BP: 122/82   BP Location: Right arm   Patient Position: Sitting   Cuff Size: Adult   Pulse: 87   Temp: 97.7 °F (36.5 °C)   SpO2: 98%   Weight: 73.9 kg (163 lb)   Height: 165.1 cm (65\")     Body mass index is 27.12 kg/m².      Current Outpatient Medications:   •  albuterol sulfate  (90 Base) MCG/ACT inhaler, Inhale 2 puffs Every 4 (Four) Hours As Needed for Wheezing., Disp: 18 g, Rfl: 3  •  buPROPion SR (Wellbutrin SR) 150 MG 12 hr tablet, 150 mg once daily for 3 days; increase to 150 mg twice daily (maximum dose: 300 mg/day)., Disp: 60 tablet, Rfl: 2  •  cyclobenzaprine (FLEXERIL) 5 MG tablet, Take 1 tablet by mouth 2 (Two) Times a Day As Needed for Muscle Spasms., Disp: 90 tablet, Rfl: 3  •  cyproheptadine (PERIACTIN) 4 MG tablet, TAKE 1 TABLET BY MOUTH TWICE DAILY AS NEEDED FOR ALLERGIES, Disp: 60 tablet, Rfl: 4  •  diphenhydrAMINE " (Banophen) 25 mg capsule, Take 1 capsule by mouth Daily., Disp: 120 capsule, Rfl: 4  •  estradiol (ESTRACE) 0.5 MG tablet, Take 1 tablet by mouth Daily., Disp: 30 tablet, Rfl: 3  •  fluticasone (Flonase) 50 MCG/ACT nasal spray, 2 sprays into the nostril(s) as directed by provider Daily., Disp: 18.2 mL, Rfl: 3  •  folic acid (FOLVITE) 1 MG tablet, Take 1 tablet by mouth Daily., Disp: 90 tablet, Rfl: 1  •  ibuprofen (ADVIL,MOTRIN) 600 MG tablet, Take 1 tablet by mouth Every 8 (Eight) Hours As Needed for Moderate Pain ., Disp: 90 tablet, Rfl: 1  •  latanoprost (XALATAN) 0.005 % ophthalmic solution, , Disp: , Rfl:   •  loratadine-pseudoephedrine (Claritin-D 24 Hour)  MG per 24 hr tablet, Take 1 tablet by mouth Daily., Disp: 90 tablet, Rfl: 1  •  losartan (COZAAR) 50 MG tablet, Take 1 tablet by mouth Daily., Disp: 30 tablet, Rfl: 3  •  medroxyPROGESTERone (Provera) 5 MG tablet, Take 1 tablet by mouth Daily., Disp: 30 tablet, Rfl: 3  •  pramipexole (Mirapex) 0.125 MG tablet, Take 3-5 tablets nightly., Disp: 90 tablet, Rfl: 0  •  Rimegepant Sulfate (Nurtec) 75 MG tablet dispersible tablet, Take 1 tablet by mouth Daily As Needed (migraine)., Disp: 8 tablet, Rfl: 6  •  traZODone (DESYREL) 50 MG tablet, One or two q hs for anxiety and insomnia, Disp: 60 tablet, Rfl: 3  •  Erenumab-aooe (Aimovig) 70 MG/ML prefilled syringe, Inject 1 mL under the skin into the appropriate area as directed 1 (One) Time for 1 dose., Disp: 3 pen, Rfl: 2    Patient Active Problem List   Diagnosis   • Menometrorrhagia   • Surgical follow-up care   • Anxiety   • Neck pain   • Nonintractable headache   • Hypertension   • Hyperparathyroidism (HCC)   • Atopic rhinitis   • Weight loss   • Cigarette smoker   • Erythema nodosum   • Gastroesophageal reflux disease   • Vitamin D deficiency     Past Surgical History:   Procedure Laterality Date   • HERNIA REPAIR      Umbilical hernia repair   • INJECTION OF MEDICATION  02/20/2016    Celestone  (betamethasone) (1)  -  RICKI Perez   • OTHER SURGICAL HISTORY      Remove tendon sheath lesion 89826 (1)  - Ganglion cyst of the left wrist   • TUBAL ABDOMINAL LIGATION       Social History     Socioeconomic History   • Marital status:    Tobacco Use   • Smoking status: Current Every Day Smoker     Packs/day: 1.00     Years: 15.00     Pack years: 15.00     Types: Cigarettes   • Smokeless tobacco: Never Used   • Tobacco comment: 10-19 cigs/day   Vaping Use   • Vaping Use: Never used   Substance and Sexual Activity   • Alcohol use: No   • Drug use: No   • Sexual activity: Yes     Family History   Problem Relation Age of Onset   • Diabetes Other    • Hypertension Other    • Stroke Other    • Thyroid disease Other      Office Visit on 10/04/2021   Component Date Value Ref Range Status   • Folate 10/05/2021 >20.00  4.78 - 24.20 ng/mL Final   • Homocysteine, Plasma (Quant) 10/05/2021 13.7  0.0 - 15.0 umol/L Final   • Methylmalonic Acid 10/05/2021 170  0 - 378 nmol/L Final   • Disclaimer: 10/05/2021 Comment   Final    This test was developed and its performance characteristics  determined by [facility]. It has not been cleared or approved  by the Food and Drug Administration.   • Magnesium 10/05/2021 1.9  1.6 - 2.6 mg/dL Final   Office Visit on 08/09/2021   Component Date Value Ref Range Status   • Methylmalonic Acid 08/11/2021 119  0 - 378 nmol/L Final   • Disclaimer: 08/11/2021 Comment   Final    This test was developed and its performance characteristics  determined by [facility]. It has not been cleared or approved  by the Food and Drug Administration.   • Homocysteine, Plasma (Quant) 08/11/2021 19.4* 0.0 - 15.0 umol/L Final   • Vitamin B-12 08/11/2021 211  211 - 946 pg/mL Final   • Folate 08/11/2021 2.32* 4.78 - 24.20 ng/mL Final   • Glucose 08/11/2021 99  65 - 99 mg/dL Final   • BUN 08/11/2021 11  6 - 20 mg/dL Final   • Creatinine 08/11/2021 0.74  0.57 - 1.00 mg/dL Final   • Sodium 08/11/2021 138  136 - 145 mmol/L  Final   • Potassium 08/11/2021 4.3  3.5 - 5.2 mmol/L Final   • Chloride 08/11/2021 106  98 - 107 mmol/L Final   • CO2 08/11/2021 21.1* 22.0 - 29.0 mmol/L Final   • Calcium 08/11/2021 10.7* 8.6 - 10.5 mg/dL Final   • Total Protein 08/11/2021 7.0  6.0 - 8.5 g/dL Final   • Albumin 08/11/2021 4.00  3.50 - 5.20 g/dL Final   • ALT (SGPT) 08/11/2021 34* 1 - 33 U/L Final   • AST (SGOT) 08/11/2021 25  1 - 32 U/L Final   • Alkaline Phosphatase 08/11/2021 105  39 - 117 U/L Final   • Total Bilirubin 08/11/2021 0.4  0.0 - 1.2 mg/dL Final   • eGFR   Amer 08/11/2021 102  >60 mL/min/1.73 Final   • Globulin 08/11/2021 3.0  gm/dL Final   • A/G Ratio 08/11/2021 1.3  g/dL Final   • BUN/Creatinine Ratio 08/11/2021 14.9  7.0 - 25.0 Final   • Anion Gap 08/11/2021 10.9  5.0 - 15.0 mmol/L Final   Office Visit on 06/25/2021   Component Date Value Ref Range Status   • Glucose 06/25/2021 71  65 - 99 mg/dL Final   • BUN 06/25/2021 13  6 - 20 mg/dL Final   • Creatinine 06/25/2021 0.77  0.57 - 1.00 mg/dL Final   • Sodium 06/25/2021 136  136 - 145 mmol/L Final   • Potassium 06/25/2021 4.2  3.5 - 5.2 mmol/L Final   • Chloride 06/25/2021 105  98 - 107 mmol/L Final   • CO2 06/25/2021 22.2  22.0 - 29.0 mmol/L Final   • Calcium 06/25/2021 10.6* 8.6 - 10.5 mg/dL Final   • Total Protein 06/25/2021 6.8  6.0 - 8.5 g/dL Final   • Albumin 06/25/2021 4.30  3.50 - 5.20 g/dL Final   • ALT (SGPT) 06/25/2021 46* 1 - 33 U/L Final   • AST (SGOT) 06/25/2021 30  1 - 32 U/L Final   • Alkaline Phosphatase 06/25/2021 87  39 - 117 U/L Final   • Total Bilirubin 06/25/2021 0.3  0.0 - 1.2 mg/dL Final   • eGFR   Amer 06/25/2021 97  >60 mL/min/1.73 Final   • Globulin 06/25/2021 2.5  gm/dL Final   • A/G Ratio 06/25/2021 1.7  g/dL Final   • BUN/Creatinine Ratio 06/25/2021 16.9  7.0 - 25.0 Final   • Anion Gap 06/25/2021 8.8  5.0 - 15.0 mmol/L Final   • TSH 06/25/2021 0.913  0.270 - 4.200 uIU/mL Final   • Vitamin B-12 06/25/2021 255  211 - 946 pg/mL Final   • Folate  06/25/2021 3.00* 4.78 - 24.20 ng/mL Final   • Hepatitis C Ab 06/25/2021 <0.1  0.0 - 0.9 s/co ratio Final   • Ferritin 06/25/2021 88.30  13.00 - 150.00 ng/mL Final   • Iron 06/25/2021 106  37 - 145 mcg/dL Final   • Iron Saturation 06/25/2021 30  20 - 50 % Final   • Transferrin 06/25/2021 234  200 - 360 mg/dL Final   • TIBC 06/25/2021 349  298 - 536 mcg/dL Final   • WBC 06/25/2021 5.20  3.40 - 10.80 10*3/mm3 Final   • RBC 06/25/2021 4.87  3.77 - 5.28 10*6/mm3 Final   • Hemoglobin 06/25/2021 14.0  12.0 - 15.9 g/dL Final   • Hematocrit 06/25/2021 43.7  34.0 - 46.6 % Final   • MCV 06/25/2021 89.7  79.0 - 97.0 fL Final   • MCH 06/25/2021 28.7  26.6 - 33.0 pg Final   • MCHC 06/25/2021 32.0  31.5 - 35.7 g/dL Final   • RDW 06/25/2021 13.2  12.3 - 15.4 % Final   • RDW-SD 06/25/2021 43.8  37.0 - 54.0 fl Final   • MPV 06/25/2021 11.3  6.0 - 12.0 fL Final   • Platelets 06/25/2021 351  140 - 450 10*3/mm3 Final   • Neutrophil % 06/25/2021 49.0  42.7 - 76.0 % Final   • Lymphocyte % 06/25/2021 41.9  19.6 - 45.3 % Final   • Monocyte % 06/25/2021 8.5  5.0 - 12.0 % Final   • Eosinophil % 06/25/2021 0.0* 0.3 - 6.2 % Final   • Basophil % 06/25/2021 0.4  0.0 - 1.5 % Final   • Immature Grans % 06/25/2021 0.2  0.0 - 0.5 % Final   • Neutrophils, Absolute 06/25/2021 2.55  1.70 - 7.00 10*3/mm3 Final   • Lymphocytes, Absolute 06/25/2021 2.18  0.70 - 3.10 10*3/mm3 Final   • Monocytes, Absolute 06/25/2021 0.44  0.10 - 0.90 10*3/mm3 Final   • Eosinophils, Absolute 06/25/2021 0.00  0.00 - 0.40 10*3/mm3 Final   • Basophils, Absolute 06/25/2021 0.02  0.00 - 0.20 10*3/mm3 Final   • Immature Grans, Absolute 06/25/2021 0.01  0.00 - 0.05 10*3/mm3 Final   • nRBC 06/25/2021 0.0  0.0 - 0.2 /100 WBC Final   • Interpretation 06/25/2021 Comment   Final    Negative  Not infected with HCV, unless recent infection is suspected or other  evidence exists to indicate HCV infection.      Home Sleep Study  [address]  [place] [address]  Phone: [phone]  Fax: [phone]    Home Sleep Study Interpretation    Patient's Name: Swapna Schaffer  YOB: 1975  Primary Care Physician: Adis Gould MD  Date of Study: 08/16/2021  Ordering Provider: Gail ALEGRE  Interpreting Physician: Daniel Seo MD.  Date of Interpretation: 8/18/2021    Indications/Narrative:  Patient is a 45 y.o. female with history of excessive daytime sleepiness,   at work sleepiness scale of 21, neck circumference 13 inches. This is a   technical adequate study.  I have personally reviewed the raw data and   summarized as below. Patient had a total recording time of 6 hours 24   minutes, total presumed sleep time of 4 hours and 55 minutes, mean oxygen   saturation 86% with a nery of 90%, mean pulse rate 89 bpm, no significant   respiratory events overall pAHI 1.1 events per hour, RDI 1.4 events per   hour, sleep latency 23 minutes, REM latency 92 minutes, snoring was mild.    Impression:  1. After review of this home sleep testing data, there is no evidence of   significant sleep disordered breathing, overall P AHI 1.1 events per hour    Recommendation:  1. If clinical suspicion of sleep disordered breathing is high recommend   in lab diagnostic PSG  2. Home sleep testing may underestimate diagnosis and severity of his   sleep apnea, it is based on presumed sleep time  3. Consider other possible etiologies of this patient symptoms including   but not limited to idiopathic hypersomnia, insufficient sleep syndrome,   medications, undiagnosed sleep disordered breathing, poor sleep hygiene   practices.  4. Clinical correlation recommended.    Interpreted by:    Daniel Seo MD, FACP, FCCP  Diplomate in Pulmonary & Sleep Medicine    This document has been electronically signed by Daniel Seo MD on   August 18, 2021 14:05 CDT      EMR Dragon/Transcription disclaimer:   Some of this note may be an  electronic transcription/translation of spoken   language to printed text. The electronic translation of spoken language   may permit erroneous, or at times, nonsensical words or phrases to be   inadvertently transcribed; Although I have reviewed the note for such   errors, some may still exist.      [unfilled]  Immunization History   Administered Date(s) Administered   • FluLaval/Fluarix/Fluzone >6 10/04/2021     The following portions of the patient's history were reviewed and updated as appropriate: allergies, current medications, past family history, past medical history, past social history, past surgical history and problem list.    PHQ-9 Total Score:             Physical Exam  Constitutional:       Appearance: Normal appearance.   HENT:      Head: Normocephalic and atraumatic.      Right Ear: External ear normal.      Left Ear: External ear normal.   Eyes:      General:         Right eye: No discharge.         Left eye: No discharge.      Conjunctiva/sclera: Conjunctivae normal.   Cardiovascular:      Rate and Rhythm: Normal rate and regular rhythm.      Pulses: Normal pulses.      Heart sounds: Normal heart sounds. No murmur heard.      Pulmonary:      Effort: Pulmonary effort is normal. No respiratory distress.      Breath sounds: Normal breath sounds.   Abdominal:      General: There is no distension.      Palpations: Abdomen is soft.      Tenderness: There is no abdominal tenderness.   Musculoskeletal:      Cervical back: Normal range of motion.      Right lower leg: No edema.      Left lower leg: No edema.   Lymphadenopathy:      Cervical: No cervical adenopathy.   Neurological:      Mental Status: She is alert. Mental status is at baseline.   Psychiatric:         Mood and Affect: Mood normal.         Behavior: Behavior normal.         Assessment/Plan    Diagnosis Plan   1. Migraine with aura and without status migrainosus, not intractable  Erenumab-aooe (Aimovig) 70 MG/ML prefilled syringe   2. RLS  (restless legs syndrome)  pramipexole (Mirapex) 0.125 MG tablet      No orders of the defined types were placed in this encounter.    Restless leg syndrome; partial relief with Mirapex, will continue to titrate as patient tolerating well, was understanding, agreeable to plan.    Migraine; due to frequency of migraines, counseled Aimovig for reduction of frequency.  Discussed possible adverse effects of medication, benefits of therapy.  Will track number of migraines per month, follow-up in 1 month, sooner if needed.  Strict ER/return precaution given.  Sample handout in clinic today, additional sample given of Ubrelvy.  Follow-up in 1 month, sooner if needed.           This document has been electronically signed by Adis Gould MD on November 4, 2021 13:43 CDT

## 2021-11-04 NOTE — TELEPHONE ENCOUNTER
Received a message from [email or web address] that Aimovig needs a PA.    Sent in PA through [email or web address]

## 2021-11-05 NOTE — TELEPHONE ENCOUNTER
Approved for a max of 3 fills from 11/5/21 - 2/4/22, with quantity restriction of one.    Faxed approval to [facility].

## 2021-11-11 ENCOUNTER — OFFICE VISIT (OUTPATIENT)
Dept: GASTROENTEROLOGY | Facility: CLINIC | Age: 46
End: 2021-11-11

## 2021-11-11 VITALS
WEIGHT: 164 LBS | HEIGHT: 65 IN | SYSTOLIC BLOOD PRESSURE: 117 MMHG | HEART RATE: 89 BPM | BODY MASS INDEX: 27.32 KG/M2 | DIASTOLIC BLOOD PRESSURE: 79 MMHG

## 2021-11-11 DIAGNOSIS — Z80.0 FAMILY HISTORY OF COLON CANCER: ICD-10-CM

## 2021-11-11 DIAGNOSIS — Z12.11 ENCOUNTER FOR SCREENING FOR MALIGNANT NEOPLASM OF COLON: Primary | ICD-10-CM

## 2021-11-11 PROCEDURE — S0260 H&P FOR SURGERY: HCPCS | Performed by: NURSE PRACTITIONER

## 2021-11-11 RX ORDER — SODIUM, POTASSIUM,MAG SULFATES 17.5-3.13G
1 SOLUTION, RECONSTITUTED, ORAL ORAL EVERY 12 HOURS
Qty: 354 ML | Refills: 0 | Status: SHIPPED | OUTPATIENT
Start: 2021-11-11 | End: 2021-12-13

## 2021-11-11 RX ORDER — DEXTROSE AND SODIUM CHLORIDE 5; .45 G/100ML; G/100ML
30 INJECTION, SOLUTION INTRAVENOUS CONTINUOUS PRN
Status: CANCELLED | OUTPATIENT
Start: 2021-11-11

## 2021-11-11 NOTE — PATIENT INSTRUCTIONS
"https://www.cancer.org/cancer/colon-rectal-cancer/causes-risks-prevention/risk-factors.html\">   Colorectal Cancer Screening    Colorectal cancer screening is a group of tests that are used to check for colorectal cancer before symptoms develop. Colorectal refers to the colon and rectum. The colon and rectum are located at the end of the digestive tract and carry stool (feces) out of the body.  Who should have screening?  All adults who are 45-75 years old should have screening. Your health care provider may recommend screening before age 45. You will have tests every 1-10 years, depending on your results and the type of screening test. Screening recommendations for adults who are 76-85 years old vary depending on a person's health. People older than age 85 should no longer get colorectal cancer screening.  You may have screening tests starting before age 45, or more often than other people, if you have any of these risk factors:  · A personal or family history of colorectal cancer or abnormal growths known as polyps in your colon.  · Inflammatory bowel disease, such as ulcerative colitis or Crohn's disease.  · A history of having radiation treatment to the abdomen or the area between the hip bones (pelvic area) for cancer.  · A type of genetic syndrome that is passed from parent to child (hereditary), such as:  ? Daniel syndrome.  ? Familial adenomatous polyposis.  ? Turcot syndrome.  ? Peutz-Jeghers syndrome.  ? MUTYH-associated polyposis (MAP).  · A personal history of diabetes.  Types of tests  There are several types of colorectal screening tests. You may have one or more of the following:  · Guaiac-based fecal occult blood testing. For this test, a stool sample is checked for hidden (occult) blood, which could be a sign of colorectal cancer.  · Fecal immunochemical test (FIT). For this test, a stool sample is checked for blood, which could be a sign of colorectal cancer.  · Stool DNA test. For this test, a stool " sample is checked for blood and changes in DNA that could lead to colorectal cancer.  · Sigmoidoscopy. During this test, a thin, flexible tube with a camera on the end, called a sigmoidoscope, is used to examine the rectum and the lower colon.  · Colonoscopy. During this test, a long, flexible tube with a camera on the end, called a colonoscope, is used to examine the entire colon and rectum. Also, sometimes a tissue sample is taken to be looked at under a microscope (biopsy) or small polyps are removed during this test.  · Virtual colonoscopy. Instead of a colonoscope, this type of colonoscopy uses a CT scan to take pictures of the colon and rectum. A CT scan is a type of X-ray that is made using computers.  What are the benefits of screening?  Screening reduces your risk for colorectal cancer and can help identify cancer at an early stage, when the cancer can be removed or treated more easily. It is common for polyps to form in the lining of the colon, especially as you age. These polyps may be cancerous or become cancerous over time. Screening can identify these polyps.  What are the risks of screening?  Generally, these are safe tests. However, problems may occur, including:  · The need for more tests to confirm results from a stool sample test. Stool sample tests have fewer risks than other types of screening tests.  · Being exposed to low levels of radiation, if you had a test involving X-rays. This may slightly increase your cancer risk. The benefit of detecting cancer outweighs the slight increase in risk.  · Bleeding, damage to the intestine, or infection caused by a sigmoidoscopy or colonoscopy.  · A reaction to medicines given during a sigmoidoscopy or colonoscopy.  Talk with your health care provider to understand your risk for colorectal cancer and to make a screening plan that is right for you.  Questions to ask your health care provider  · When should I start colorectal cancer screening?  · What is my  risk for colorectal cancer?  · How often do I need screening?  · Which screening tests do I need?  · How do I get my test results?  · What do my results mean?  Where to find more information  Learn more about colorectal cancer screening from:  · The American Cancer Society: cancer.org  · National Cancer [place]: cancer.gov  Summary  · Colorectal cancer screening is a group of tests used to check for colorectal cancer before symptoms develop.  · All adults who are 45-75 years old should have screening. Your health care provider may recommend screening before age 45.  · You may have screening tests starting before age 45, or more often than other people, if you have certain risk factors.  · Screening reduces your risk for colorectal cancer and can help identify cancer at an early stage, when the cancer can be removed or treated more easily.  · Talk with your health care provider to understand your risk for colorectal cancer and to make a screening plan that is right for you.  This information is not intended to replace advice given to you by your health care provider. Make sure you discuss any questions you have with your health care provider.  Document Revised: 04/07/2021 Document Reviewed: 04/07/2021  Elsevier Patient Education © 2021 [employer]

## 2021-11-11 NOTE — PROGRESS NOTES
Chief Complaint   Patient presents with   • Colon Cancer Screening       Subjective    Swapna Schaffer is a 46 y.o. female. she is being seen for consultation today at the request of Dr. Gould    History of Present Illness  46-year-old female presents to discuss initial screening colonoscopy.  She denies any abdominal pain nausea vomiting or changes in her bowel habits.  Reports normally she has bowel movement about twice a week that is been normal for her since she was a teenager.  Denies any melena or hematochezia.  Plan; schedule patient for initial screening colonoscopy.       The following portions of the patient's history were reviewed and updated as appropriate:   Past Medical History:   Diagnosis Date   • Abdominal pain    • Abnormal uterine bleeding     Other specified abnormal uterine and vaginal bleeding     • Acute low back pain    • Diarrhea    • Epigastric pain    • Erythema nodosum    • Essential hypertension    • Excessive and frequent menstruation with irregular cycle    • Excessive or frequent menstruation    • Generalized anxiety disorder    • History of carpal tunnel syndrome    • Hypercalcemia    • Hyperthyroidism    • Leiomyoma of uterus     unspecified   • Metrorrhagia    • Nausea    • Neck pain     resolved    • Numbness of hand    • Secondary dysmenorrhea      Past Surgical History:   Procedure Laterality Date   • HERNIA REPAIR      Umbilical hernia repair   • INJECTION OF MEDICATION  02/20/2016    Celestone (betamethasone) (1)  -  RICKI Perez   • OTHER SURGICAL HISTORY      Remove tendon sheath lesion 83054 (1)  - Ganglion cyst of the left wrist   • TUBAL ABDOMINAL LIGATION       Family History   Problem Relation Age of Onset   • Diabetes Other    • Hypertension Other    • Stroke Other    • Thyroid disease Other      OB History    No obstetric history on file.       Prior to Admission medications    Medication Sig Start Date End Date Taking? Authorizing Provider   albuterol sulfate   (90 Base) MCG/ACT inhaler Inhale 2 puffs Every 4 (Four) Hours As Needed for Wheezing. 8/9/21   Adis Gould MD   buPROPion SR (Wellbutrin SR) 150 MG 12 hr tablet 150 mg once daily for 3 days; increase to 150 mg twice daily (maximum dose: 300 mg/day). 10/4/21   Adis Gould MD   cyclobenzaprine (FLEXERIL) 5 MG tablet Take 1 tablet by mouth 2 (Two) Times a Day As Needed for Muscle Spasms. 10/4/21   Adis Gould MD   cyproheptadine (PERIACTIN) 4 MG tablet TAKE 1 TABLET BY MOUTH TWICE DAILY AS NEEDED FOR ALLERGIES 1/18/21   Ade Winn APRN   diphenhydrAMINE (Banophen) 25 mg capsule Take 1 capsule by mouth Daily. 10/4/21   Adis Gould MD   estradiol (ESTRACE) 0.5 MG tablet Take 1 tablet by mouth Daily. 10/4/21   Adis Gould MD   fluticasone (Flonase) 50 MCG/ACT nasal spray 2 sprays into the nostril(s) as directed by provider Daily. 8/9/21   Adis Gould MD   folic acid (FOLVITE) 1 MG tablet Take 1 tablet by mouth Daily. 10/4/21   Adis Gould MD   ibuprofen (ADVIL,MOTRIN) 600 MG tablet Take 1 tablet by mouth Every 8 (Eight) Hours As Needed for Moderate Pain . 7/9/21   Adis Gould MD   latanoprost (XALATAN) 0.005 % ophthalmic solution  6/30/21   Provider, MD Valdemar   loratadine-pseudoephedrine (Claritin-D 24 Hour)  MG per 24 hr tablet Take 1 tablet by mouth Daily. 8/9/21   Adis Gould MD   losartan (COZAAR) 50 MG tablet Take 1 tablet by mouth Daily. 3/23/21   Ade Winn APRN   medroxyPROGESTERone (Provera) 5 MG tablet Take 1 tablet by mouth Daily. 10/4/21   Adis Gould MD   pramipexole (Mirapex) 0.125 MG tablet Take 3-5 tablets nightly. 11/4/21   Adis Gould MD   Rimegepant Sulfate (Nurtec) 75 MG tablet dispersible tablet Take 1 tablet by mouth Daily As Needed (migraine). 10/4/21   Adis Gould MD   traZODone (DESYREL) 50 MG tablet One or two q hs for anxiety and insomnia 10/4/21   Adis Gould MD   losartan  "(COZAAR) 100 MG tablet TAKE 1/2 TABLET BY MOUTH DAILY. 7/6/20   Ade Winn APRN     Allergies   Allergen Reactions   • Naproxen      Social History     Socioeconomic History   • Marital status:    Tobacco Use   • Smoking status: Current Every Day Smoker     Packs/day: 1.00     Years: 15.00     Pack years: 15.00     Types: Cigarettes   • Smokeless tobacco: Never Used   • Tobacco comment: 10-19 cigs/day   Vaping Use   • Vaping Use: Never used   Substance and Sexual Activity   • Alcohol use: No   • Drug use: No   • Sexual activity: Yes       Review of Systems  Review of Systems   Constitutional: Negative for activity change, appetite change, chills, diaphoresis, fatigue, fever and unexpected weight change.   HENT: Negative for sore throat and trouble swallowing.    Respiratory: Negative for shortness of breath.    Gastrointestinal: Negative for abdominal distention, abdominal pain, anal bleeding, blood in stool, constipation, diarrhea, nausea, rectal pain and vomiting.   Musculoskeletal: Negative for arthralgias.   Skin: Negative for pallor.   Neurological: Negative for light-headedness.        /79 (BP Location: Other (Comment), Patient Position: Sitting, Cuff Size: Adult) Comment (BP Location): left wrist-auto  Pulse 89   Ht 165.1 cm (65\")   Wt 74.4 kg (164 lb)   LMP 10/10/2019   BMI 27.29 kg/m²     Objective    Physical Exam  Constitutional:       General: She is not in acute distress.     Appearance: Normal appearance. She is normal weight. She is not ill-appearing.   HENT:      Head: Normocephalic and atraumatic.   Pulmonary:      Effort: Pulmonary effort is normal.   Abdominal:      General: Bowel sounds are normal. There is no distension.      Palpations: Abdomen is soft. There is no mass.      Tenderness: There is no abdominal tenderness.   Neurological:      Mental Status: She is alert.       Office Visit on 10/04/2021   Component Date Value Ref Range Status   • Folate 10/05/2021 " >20.00  4.78 - 24.20 ng/mL Final   • Homocysteine, Plasma (Quant) 10/05/2021 13.7  0.0 - 15.0 umol/L Final   • Methylmalonic Acid 10/05/2021 170  0 - 378 nmol/L Final   • Disclaimer: 10/05/2021 Comment   Final    This test was developed and its performance characteristics  determined by [facility]. It has not been cleared or approved  by the Food and Drug Administration.   • Magnesium 10/05/2021 1.9  1.6 - 2.6 mg/dL Final     Assessment/Plan      1. Encounter for screening for malignant neoplasm of colon    2. Family history of colon cancer    .       Orders placed during this encounter include:  Orders Placed This Encounter   Procedures   • Follow Anesthesia Guidelines / Standing Orders     Standing Status:   Future   • Obtain Informed Consent     Standing Status:   Future     Order Specific Question:   Informed Consent Given For     Answer:   COLONOSCOPY       COLONOSCOPY (N/A)    Review and/or summary of lab tests, radiology, procedures, medications. Review and summary of old records and obtaining of history. The risks and benefits of my recommendations, as well as other treatment options were discussed with the patient today. Questions were answered.    New Medications Ordered This Visit   Medications   • sodium-potassium-magnesium sulfates (Suprep Bowel Prep Kit) 17.5-3.13-1.6 GM/177ML solution oral solution     Sig: Take 1 bottle by mouth Every 12 (Twelve) Hours.     Dispense:  354 mL     Refill:  0       Follow-up: Return in about 4 weeks (around 12/9/2021) for Recheck, After test.          This document has been electronically signed by SIS Giraldo on November 11, 2021 15:04 CST           I spent 10 minutes caring for Swapna on this date of service. This time includes time spent by me in the following activities:preparing for the visit, reviewing tests, obtaining and/or reviewing a separately obtained history, performing a medically appropriate examination and/or evaluation , counseling and educating  the patient/family/caregiver, ordering medications, tests, or procedures, referring and communicating with other health care professionals , documenting information in the medical record and care coordination    Results for orders placed or performed in visit on 10/04/21   Methylmalonic Acid, Serum    Specimen: Blood   Result Value Ref Range    Methylmalonic Acid 170 0 - 378 nmol/L    Disclaimer: Comment    Magnesium    Specimen: Blood   Result Value Ref Range    Magnesium 1.9 1.6 - 2.6 mg/dL   Homocysteine    Specimen: Blood   Result Value Ref Range    Homocysteine, Plasma (Quant) 13.7 0.0 - 15.0 umol/L   Folate    Specimen: Blood   Result Value Ref Range    Folate >20.00 4.78 - 24.20 ng/mL   Results for orders placed or performed in visit on 08/09/21   Methylmalonic Acid, Serum    Specimen: Blood   Result Value Ref Range    Methylmalonic Acid 119 0 - 378 nmol/L    Disclaimer: Comment    Homocysteine    Specimen: Blood   Result Value Ref Range    Homocysteine, Plasma (Quant) 19.4 (H) 0.0 - 15.0 umol/L   Folate    Specimen: Blood   Result Value Ref Range    Folate 2.32 (L) 4.78 - 24.20 ng/mL   Vitamin B12    Specimen: Blood   Result Value Ref Range    Vitamin B-12 211 211 - 946 pg/mL   Comprehensive metabolic panel    Specimen: Blood   Result Value Ref Range    Glucose 99 65 - 99 mg/dL    BUN 11 6 - 20 mg/dL    Creatinine 0.74 0.57 - 1.00 mg/dL    Sodium 138 136 - 145 mmol/L    Potassium 4.3 3.5 - 5.2 mmol/L    Chloride 106 98 - 107 mmol/L    CO2 21.1 (L) 22.0 - 29.0 mmol/L    Calcium 10.7 (H) 8.6 - 10.5 mg/dL    Total Protein 7.0 6.0 - 8.5 g/dL    Albumin 4.00 3.50 - 5.20 g/dL    ALT (SGPT) 34 (H) 1 - 33 U/L    AST (SGOT) 25 1 - 32 U/L    Alkaline Phosphatase 105 39 - 117 U/L    Total Bilirubin 0.4 0.0 - 1.2 mg/dL    eGFR  African Amer 102 >60 mL/min/1.73    Globulin 3.0 gm/dL    A/G Ratio 1.3 g/dL    BUN/Creatinine Ratio 14.9 7.0 - 25.0    Anion Gap 10.9 5.0 - 15.0 mmol/L   Results for orders placed or performed  in visit on 06/25/21   HCV Antibody Rfx To Qnt PCR    Specimen: Blood   Result Value Ref Range    Hepatitis C Ab <0.1 0.0 - 0.9 s/co ratio   Interpretation:    Specimen: Blood   Result Value Ref Range    Interpretation Comment    TSH Rfx On Abnormal To Free T4    Specimen: Blood   Result Value Ref Range    TSH 0.913 0.270 - 4.200 uIU/mL   CBC Auto Differential    Specimen: Blood   Result Value Ref Range    WBC 5.20 3.40 - 10.80 10*3/mm3    RBC 4.87 3.77 - 5.28 10*6/mm3    Hemoglobin 14.0 12.0 - 15.9 g/dL    Hematocrit 43.7 34.0 - 46.6 %    MCV 89.7 79.0 - 97.0 fL    MCH 28.7 26.6 - 33.0 pg    MCHC 32.0 31.5 - 35.7 g/dL    RDW 13.2 12.3 - 15.4 %    RDW-SD 43.8 37.0 - 54.0 fl    MPV 11.3 6.0 - 12.0 fL    Platelets 351 140 - 450 10*3/mm3    Neutrophil % 49.0 42.7 - 76.0 %    Lymphocyte % 41.9 19.6 - 45.3 %    Monocyte % 8.5 5.0 - 12.0 %    Eosinophil % 0.0 (L) 0.3 - 6.2 %    Basophil % 0.4 0.0 - 1.5 %    Immature Grans % 0.2 0.0 - 0.5 %    Neutrophils, Absolute 2.55 1.70 - 7.00 10*3/mm3    Lymphocytes, Absolute 2.18 0.70 - 3.10 10*3/mm3    Monocytes, Absolute 0.44 0.10 - 0.90 10*3/mm3    Eosinophils, Absolute 0.00 0.00 - 0.40 10*3/mm3    Basophils, Absolute 0.02 0.00 - 0.20 10*3/mm3    Immature Grans, Absolute 0.01 0.00 - 0.05 10*3/mm3    nRBC 0.0 0.0 - 0.2 /100 WBC   Iron Profile    Specimen: Blood   Result Value Ref Range    Iron 106 37 - 145 mcg/dL    Iron Saturation 30 20 - 50 %    Transferrin 234 200 - 360 mg/dL    TIBC 349 298 - 536 mcg/dL   Folate    Specimen: Blood   Result Value Ref Range    Folate 3.00 (L) 4.78 - 24.20 ng/mL   Ferritin    Specimen: Blood   Result Value Ref Range    Ferritin 88.30 13.00 - 150.00 ng/mL   Vitamin B12    Specimen: Blood   Result Value Ref Range    Vitamin B-12 255 211 - 946 pg/mL   Comprehensive Metabolic Panel    Specimen: Blood   Result Value Ref Range    Glucose 71 65 - 99 mg/dL    BUN 13 6 - 20 mg/dL    Creatinine 0.77 0.57 - 1.00 mg/dL    Sodium 136 136 - 145 mmol/L     Potassium 4.2 3.5 - 5.2 mmol/L    Chloride 105 98 - 107 mmol/L    CO2 22.2 22.0 - 29.0 mmol/L    Calcium 10.6 (H) 8.6 - 10.5 mg/dL    Total Protein 6.8 6.0 - 8.5 g/dL    Albumin 4.30 3.50 - 5.20 g/dL    ALT (SGPT) 46 (H) 1 - 33 U/L    AST (SGOT) 30 1 - 32 U/L    Alkaline Phosphatase 87 39 - 117 U/L    Total Bilirubin 0.3 0.0 - 1.2 mg/dL    eGFR  African Amer 97 >60 mL/min/1.73    Globulin 2.5 gm/dL    A/G Ratio 1.7 g/dL    BUN/Creatinine Ratio 16.9 7.0 - 25.0    Anion Gap 8.8 5.0 - 15.0 mmol/L   Results for orders placed or performed in visit on 03/23/21   Rheumatoid factor    Specimen: Blood   Result Value Ref Range    Rheumatoid Factor Quantitative <10.0 0.0 - 14.0 IU/mL   APOLLO    Specimen: Blood   Result Value Ref Range    APOLLO Direct Negative Negative   Results for orders placed or performed in visit on 02/07/17   TSH    Specimen: Blood   Result Value Ref Range    TSH 0.820 0.460 - 4.680 mIU/mL   PTH, Intact    Specimen: Blood   Result Value Ref Range    PTH, Intact 100 (H) 15 - 65 pg/mL   Comprehensive Metabolic Panel    Specimen: Blood   Result Value Ref Range    Glucose 90 60 - 100 mg/dL    BUN 10 7 - 21 mg/dL    Creatinine 0.65 0.50 - 1.00 mg/dL    Sodium 138 137 - 145 mmol/L    Potassium 4.2 3.5 - 5.1 mmol/L    Chloride 105 95 - 110 mmol/L    CO2 23.0 22.0 - 31.0 mmol/L    Calcium 10.8 (H) 8.4 - 10.2 mg/dL    Total Protein 6.7 6.3 - 8.6 g/dL    Albumin 3.90 3.40 - 4.80 g/dL    ALT (SGPT) 23 9 - 52 U/L    AST (SGOT) 16 14 - 36 U/L    Alkaline Phosphatase 67 38 - 126 U/L    Total Bilirubin 0.4 0.2 - 1.3 mg/dL    eGFR  African Amer 121 58 - 135 mL/min/1.73    Globulin 2.8 2.3 - 3.5 gm/dL    A/G Ratio 1.4 1.1 - 1.8 g/dL    BUN/Creatinine Ratio 15.4 7.0 - 25.0    Anion Gap 10.0 5.0 - 15.0 mmol/L     *Note: Due to a large number of results and/or encounters for the requested time period, some results have not been displayed. A complete set of results can be found in Results Review.

## 2021-11-15 ENCOUNTER — TELEPHONE (OUTPATIENT)
Dept: FAMILY MEDICINE CLINIC | Facility: CLINIC | Age: 46
End: 2021-11-15

## 2021-11-15 DIAGNOSIS — G43.109 MIGRAINE WITH AURA AND WITHOUT STATUS MIGRAINOSUS, NOT INTRACTABLE: Primary | ICD-10-CM

## 2021-11-15 NOTE — TELEPHONE ENCOUNTER
[facility] Sent in PA through [email or web address]

## 2021-11-16 NOTE — TELEPHONE ENCOUNTER
"\"This request has not been approved. Based on the information submitted for review, you did not meet our guideline rules for the requested drug. In order for your request to be approved, your provider would need to show that you have met the guideline rules below. The details below are written in medical language. If you have questions, please contact your provider. In some cases, the requested medication or alternatives offered may have additional approval requirements. Our guideline named ANTI-MIGRAINE CGRP INHIBITORS requires the following rule(s) be met for approval: The member had a trial and failure (at least 3 months for episodic migraine diagnosis) of at least 1 preferred calcitonin gene-related peptide inhibitor appropriate for the diagnosis: UbrelvyYour doctor told us you have migraines (a type of headache). We do not have records or chart notes showing you have tried the preferred agent for this diagnosis. This is why your request is denied. Please work with your doctor to use a different medication or get us more information if it will allow us to approve this request. A written notification letter will follow with additional details\"  "

## 2021-11-17 ENCOUNTER — TELEPHONE (OUTPATIENT)
Dept: FAMILY MEDICINE CLINIC | Facility: CLINIC | Age: 46
End: 2021-11-17

## 2021-12-10 ENCOUNTER — TELEPHONE (OUTPATIENT)
Dept: FAMILY MEDICINE CLINIC | Facility: CLINIC | Age: 46
End: 2021-12-10

## 2021-12-13 ENCOUNTER — OFFICE VISIT (OUTPATIENT)
Dept: FAMILY MEDICINE CLINIC | Facility: CLINIC | Age: 46
End: 2021-12-13

## 2021-12-13 VITALS
HEART RATE: 88 BPM | OXYGEN SATURATION: 99 % | BODY MASS INDEX: 27.99 KG/M2 | WEIGHT: 168 LBS | TEMPERATURE: 97.7 F | HEIGHT: 65 IN | SYSTOLIC BLOOD PRESSURE: 148 MMHG | DIASTOLIC BLOOD PRESSURE: 90 MMHG

## 2021-12-13 DIAGNOSIS — G43.109 MIGRAINE WITH AURA AND WITHOUT STATUS MIGRAINOSUS, NOT INTRACTABLE: ICD-10-CM

## 2021-12-13 DIAGNOSIS — J45.909 UNCOMPLICATED ASTHMA, UNSPECIFIED ASTHMA SEVERITY, UNSPECIFIED WHETHER PERSISTENT: ICD-10-CM

## 2021-12-13 DIAGNOSIS — R61 NIGHT SWEATS: ICD-10-CM

## 2021-12-13 DIAGNOSIS — G47.00 INSOMNIA, UNSPECIFIED TYPE: ICD-10-CM

## 2021-12-13 DIAGNOSIS — F41.9 ANXIETY: ICD-10-CM

## 2021-12-13 DIAGNOSIS — G25.81 RLS (RESTLESS LEGS SYNDROME): Primary | ICD-10-CM

## 2021-12-13 DIAGNOSIS — R51.9 NONINTRACTABLE HEADACHE, UNSPECIFIED CHRONICITY PATTERN, UNSPECIFIED HEADACHE TYPE: ICD-10-CM

## 2021-12-13 DIAGNOSIS — I10 ESSENTIAL HYPERTENSION: ICD-10-CM

## 2021-12-13 PROCEDURE — 99214 OFFICE O/P EST MOD 30 MIN: CPT | Performed by: STUDENT IN AN ORGANIZED HEALTH CARE EDUCATION/TRAINING PROGRAM

## 2021-12-13 RX ORDER — ESTRADIOL 0.5 MG/1
0.5 TABLET ORAL DAILY
Qty: 30 TABLET | Refills: 3 | Status: SHIPPED | OUTPATIENT
Start: 2021-12-13 | End: 2022-04-21 | Stop reason: SDUPTHER

## 2021-12-13 RX ORDER — PREGABALIN 75 MG/1
CAPSULE ORAL
Qty: 60 CAPSULE | Refills: 0 | Status: SHIPPED | OUTPATIENT
Start: 2021-12-13 | End: 2022-01-20 | Stop reason: SDUPTHER

## 2021-12-13 RX ORDER — MEDROXYPROGESTERONE ACETATE 5 MG/1
5 TABLET ORAL DAILY
Qty: 30 TABLET | Refills: 3 | Status: SHIPPED | OUTPATIENT
Start: 2021-12-13 | End: 2022-04-21 | Stop reason: SDUPTHER

## 2021-12-13 RX ORDER — IBUPROFEN 600 MG/1
600 TABLET ORAL EVERY 8 HOURS PRN
Qty: 90 TABLET | Refills: 1 | Status: SHIPPED | OUTPATIENT
Start: 2021-12-13 | End: 2022-04-21 | Stop reason: SDUPTHER

## 2021-12-13 RX ORDER — ALBUTEROL SULFATE 90 UG/1
2 AEROSOL, METERED RESPIRATORY (INHALATION) EVERY 4 HOURS PRN
Qty: 18 G | Refills: 3 | Status: SHIPPED | OUTPATIENT
Start: 2021-12-13 | End: 2022-04-21 | Stop reason: SDUPTHER

## 2021-12-13 RX ORDER — LOSARTAN POTASSIUM 50 MG/1
50 TABLET ORAL DAILY
Qty: 90 TABLET | Refills: 3 | Status: SHIPPED | OUTPATIENT
Start: 2021-12-13 | End: 2022-12-05 | Stop reason: SDUPTHER

## 2021-12-13 RX ORDER — TRAZODONE HYDROCHLORIDE 100 MG/1
100 TABLET ORAL NIGHTLY
Qty: 90 TABLET | Refills: 3 | Status: SHIPPED | OUTPATIENT
Start: 2021-12-13 | End: 2022-04-21 | Stop reason: SDUPTHER

## 2022-01-20 ENCOUNTER — OFFICE VISIT (OUTPATIENT)
Dept: FAMILY MEDICINE CLINIC | Facility: CLINIC | Age: 47
End: 2022-01-20

## 2022-01-20 VITALS
HEART RATE: 85 BPM | TEMPERATURE: 97.5 F | HEIGHT: 65 IN | DIASTOLIC BLOOD PRESSURE: 88 MMHG | WEIGHT: 161 LBS | SYSTOLIC BLOOD PRESSURE: 122 MMHG | OXYGEN SATURATION: 99 % | BODY MASS INDEX: 26.82 KG/M2

## 2022-01-20 DIAGNOSIS — G43.109 MIGRAINE WITH AURA AND WITHOUT STATUS MIGRAINOSUS, NOT INTRACTABLE: Primary | ICD-10-CM

## 2022-01-20 DIAGNOSIS — G25.81 RLS (RESTLESS LEGS SYNDROME): ICD-10-CM

## 2022-01-20 DIAGNOSIS — Z79.899 HIGH RISK MEDICATION USE: ICD-10-CM

## 2022-01-20 PROCEDURE — 99214 OFFICE O/P EST MOD 30 MIN: CPT | Performed by: STUDENT IN AN ORGANIZED HEALTH CARE EDUCATION/TRAINING PROGRAM

## 2022-01-20 RX ORDER — PREGABALIN 75 MG/1
CAPSULE ORAL
Qty: 60 CAPSULE | Refills: 2 | Status: SHIPPED | OUTPATIENT
Start: 2022-01-20 | End: 2022-04-21 | Stop reason: SDUPTHER

## 2022-01-20 RX ORDER — ERENUMAB-AOOE 70 MG/ML
1 INJECTION SUBCUTANEOUS
Qty: 1.12 ML | Refills: 6 | Status: SHIPPED | OUTPATIENT
Start: 2022-01-20 | End: 2022-04-21 | Stop reason: SDUPTHER

## 2022-01-20 RX ORDER — ERENUMAB-AOOE 70 MG/ML
1 INJECTION SUBCUTANEOUS
COMMUNITY
Start: 2022-01-14 | End: 2022-01-20 | Stop reason: SDUPTHER

## 2022-01-20 NOTE — PROGRESS NOTES
"Subjective:  Swapna Schaffer is a 46 y.o. female who presents for     Migraines; Currently on Aimovig 70 mg monthly, no adverse effects of medication, has had decreased episodes of migraine, previously with time, now estimates approximately 4 to 5/month.  No new symptoms.  Also uses Ubrelvy as needed.  Denies adverse effects of medication, no numbness, tingling, weakness, chest pain, vision changes, nausea, vomiting.    Restless leg syndrome; has tried Mirapex, gabapentin, without relief.  At previous point was started on Lyrica.  States is a good relief with medication, no adverse effects, no increased use, no history of illicit drug use, dizziness, fatigue, falls, excessive sedation.    Patient Active Problem List   Diagnosis   • Menometrorrhagia   • Surgical follow-up care   • Anxiety   • Neck pain   • Nonintractable headache   • Hypertension   • Hyperparathyroidism (HCC)   • Atopic rhinitis   • Weight loss   • Cigarette smoker   • Erythema nodosum   • Gastroesophageal reflux disease   • Vitamin D deficiency   • Encounter for screening for malignant neoplasm of colon   • Family history of colon cancer     Vitals:    Vitals:    01/20/22 1620   BP: 122/88   BP Location: Right arm   Patient Position: Sitting   Cuff Size: Adult   Pulse: 85   Temp: 97.5 °F (36.4 °C)   SpO2: 99%   Weight: 73 kg (161 lb)   Height: 165.1 cm (65\")     Body mass index is 26.79 kg/m².      Current Outpatient Medications:   •  Aimovig 70 MG/ML prefilled syringe, Inject 1 mL under the skin into the appropriate area as directed Every 30 (Thirty) Days., Disp: 1.12 mL, Rfl: 6  •  albuterol sulfate  (90 Base) MCG/ACT inhaler, Inhale 2 puffs Every 4 (Four) Hours As Needed for Wheezing., Disp: 18 g, Rfl: 3  •  buPROPion SR (Wellbutrin SR) 150 MG 12 hr tablet, 150 mg once daily for 3 days; increase to 150 mg twice daily (maximum dose: 300 mg/day)., Disp: 60 tablet, Rfl: 2  •  cyclobenzaprine (FLEXERIL) 5 MG tablet, Take 1 tablet by " mouth 2 (Two) Times a Day As Needed for Muscle Spasms., Disp: 90 tablet, Rfl: 3  •  diphenhydrAMINE (Banophen) 25 mg capsule, Take 1 capsule by mouth Daily., Disp: 120 capsule, Rfl: 4  •  estradiol (ESTRACE) 0.5 MG tablet, Take 1 tablet by mouth Daily., Disp: 30 tablet, Rfl: 3  •  fluticasone (Flonase) 50 MCG/ACT nasal spray, 2 sprays into the nostril(s) as directed by provider Daily., Disp: 18.2 mL, Rfl: 3  •  ibuprofen (ADVIL,MOTRIN) 600 MG tablet, Take 1 tablet by mouth Every 8 (Eight) Hours As Needed for Moderate Pain ., Disp: 90 tablet, Rfl: 1  •  latanoprost (XALATAN) 0.005 % ophthalmic solution, , Disp: , Rfl:   •  loratadine-pseudoephedrine (Claritin-D 24 Hour)  MG per 24 hr tablet, Take 1 tablet by mouth Daily., Disp: 90 tablet, Rfl: 1  •  losartan (COZAAR) 50 MG tablet, Take 1 tablet by mouth Daily., Disp: 90 tablet, Rfl: 3  •  medroxyPROGESTERone (Provera) 5 MG tablet, Take 1 tablet by mouth Daily., Disp: 30 tablet, Rfl: 3  •  pregabalin (Lyrica) 75 MG capsule, Take 75mg nightly, may increased to 150mg if needed/tolerated., Disp: 60 capsule, Rfl: 2  •  traZODone (DESYREL) 100 MG tablet, Take 1 tablet by mouth Every Night., Disp: 90 tablet, Rfl: 3  •  ubrogepant (UBRELVY) 100 MG tablet, Take 1 tablet by mouth 1 (One) Time As Needed (at onset of migraine)., Disp: 10 tablet, Rfl: 3  •  folic acid (FOLVITE) 1 MG tablet, Take 1 tablet by mouth Daily., Disp: 90 tablet, Rfl: 1    Patient Active Problem List   Diagnosis   • Menometrorrhagia   • Surgical follow-up care   • Anxiety   • Neck pain   • Nonintractable headache   • Hypertension   • Hyperparathyroidism (HCC)   • Atopic rhinitis   • Weight loss   • Cigarette smoker   • Erythema nodosum   • Gastroesophageal reflux disease   • Vitamin D deficiency   • Encounter for screening for malignant neoplasm of colon   • Family history of colon cancer     Past Surgical History:   Procedure Laterality Date   • HERNIA REPAIR      Umbilical hernia repair   •  INJECTION OF MEDICATION  02/20/2016    [facility] (betamethasone) (1)  -  RICKI Perez   • OTHER SURGICAL HISTORY      Remove tendon sheath lesion 81975 (1)  - Ganglion cyst of the left wrist   • TUBAL ABDOMINAL LIGATION       Social History     Socioeconomic History   • Marital status:    Tobacco Use   • Smoking status: Current Every Day Smoker     Packs/day: 1.00     Years: 15.00     Pack years: 15.00     Types: Cigarettes   • Smokeless tobacco: Never Used   • Tobacco comment: 10-19 cigs/day   Vaping Use   • Vaping Use: Never used   Substance and Sexual Activity   • Alcohol use: No   • Drug use: No   • Sexual activity: Yes     Family History   Problem Relation Age of Onset   • Diabetes Other    • Hypertension Other    • Stroke Other    • Thyroid disease Other      Office Visit on 10/04/2021   Component Date Value Ref Range Status   • Folate 10/05/2021 >20.00  4.78 - 24.20 ng/mL Final   • Homocysteine, Plasma (Quant) 10/05/2021 13.7  0.0 - 15.0 umol/L Final   • Methylmalonic Acid 10/05/2021 170  0 - 378 nmol/L Final   • Disclaimer: 10/05/2021 Comment   Final    This test was developed and its performance characteristics  determined by [facility]. It has not been cleared or approved  by the Food and Drug Administration.   • Magnesium 10/05/2021 1.9  1.6 - 2.6 mg/dL Final   Office Visit on 08/09/2021   Component Date Value Ref Range Status   • Methylmalonic Acid 08/11/2021 119  0 - 378 nmol/L Final   • Disclaimer: 08/11/2021 Comment   Final    This test was developed and its performance characteristics  determined by [facility]. It has not been cleared or approved  by the Food and Drug Administration.   • Homocysteine, Plasma (Quant) 08/11/2021 19.4* 0.0 - 15.0 umol/L Final   • Vitamin B-12 08/11/2021 211  211 - 946 pg/mL Final   • Folate 08/11/2021 2.32* 4.78 - 24.20 ng/mL Final   • Glucose 08/11/2021 99  65 - 99 mg/dL Final   • BUN 08/11/2021 11  6 - 20 mg/dL Final   • Creatinine 08/11/2021 0.74  0.57 - 1.00 mg/dL  Final   • Sodium 08/11/2021 138  136 - 145 mmol/L Final   • Potassium 08/11/2021 4.3  3.5 - 5.2 mmol/L Final   • Chloride 08/11/2021 106  98 - 107 mmol/L Final   • CO2 08/11/2021 21.1* 22.0 - 29.0 mmol/L Final   • Calcium 08/11/2021 10.7* 8.6 - 10.5 mg/dL Final   • Total Protein 08/11/2021 7.0  6.0 - 8.5 g/dL Final   • Albumin 08/11/2021 4.00  3.50 - 5.20 g/dL Final   • ALT (SGPT) 08/11/2021 34* 1 - 33 U/L Final   • AST (SGOT) 08/11/2021 25  1 - 32 U/L Final   • Alkaline Phosphatase 08/11/2021 105  39 - 117 U/L Final   • Total Bilirubin 08/11/2021 0.4  0.0 - 1.2 mg/dL Final   • eGFR   Amer 08/11/2021 102  >60 mL/min/1.73 Final   • Globulin 08/11/2021 3.0  gm/dL Final   • A/G Ratio 08/11/2021 1.3  g/dL Final   • BUN/Creatinine Ratio 08/11/2021 14.9  7.0 - 25.0 Final   • Anion Gap 08/11/2021 10.9  5.0 - 15.0 mmol/L Final      Home Sleep Study  [place] [address]  Phone: [phone]  Fax: [phone]    Home Sleep Study Interpretation    Patient's Name: Swapna Schaffer  YOB: 1975  Primary Care Physician: Adis Gould MD  Date of Study: 08/16/2021  Ordering Provider: Gail ALEGRE  Interpreting Physician: Daniel Seo MD.  Date of Interpretation: 8/18/2021    Indications/Narrative:  Patient is a 45 y.o. female with history of excessive daytime sleepiness,   at work sleepiness scale of 21, neck circumference 13 inches. This is a   technical adequate study.  I have personally reviewed the raw data and   summarized as below. Patient had a total recording time of 6 hours 24   minutes, total presumed sleep time of 4 hours and 55 minutes, mean oxygen   saturation 86% with a nery of 90%, mean pulse rate 89 bpm, no significant   respiratory events overall pAHI 1.1 events per hour, RDI 1.4 events per   hour, sleep latency 23 minutes, REM latency 92 minutes, snoring was mild.    Impression:  1. After review of this  home sleep testing data, there is no evidence of   significant sleep disordered breathing, overall P AHI 1.1 events per hour    Recommendation:  1. If clinical suspicion of sleep disordered breathing is high recommend   in lab diagnostic PSG  2. Home sleep testing may underestimate diagnosis and severity of his   sleep apnea, it is based on presumed sleep time  3. Consider other possible etiologies of this patient symptoms including   but not limited to idiopathic hypersomnia, insufficient sleep syndrome,   medications, undiagnosed sleep disordered breathing, poor sleep hygiene   practices.  4. Clinical correlation recommended.    Interpreted by:    Daniel Seo MD, FACP, FCCP  Diplomate in Pulmonary & Sleep Medicine    This document has been electronically signed by Daniel Seo MD on   August 18, 2021 14:05 CDT      EMR Dragon/Transcription disclaimer:   Some of this note may be an electronic transcription/translation of spoken   language to printed text. The electronic translation of spoken language   may permit erroneous, or at times, nonsensical words or phrases to be   inadvertently transcribed; Although I have reviewed the note for such   errors, some may still exist.      [unfilled]  Immunization History   Administered Date(s) Administered   • FluLaval/Fluarix/Fluzone >6 10/04/2021     The following portions of the patient's history were reviewed and updated as appropriate: allergies, current medications, past family history, past medical history, past social history, past surgical history and problem list.    PHQ-9 Total Score: 0         Physical Exam  Constitutional:       Appearance: Normal appearance.   HENT:      Head: Normocephalic and atraumatic.      Right Ear: External ear normal.      Left Ear: External ear normal.   Eyes:      General:         Right eye: No discharge.         Left eye: No discharge.      Conjunctiva/sclera: Conjunctivae normal.   Cardiovascular:      Rate and  Rhythm: Normal rate and regular rhythm.      Pulses: Normal pulses.      Heart sounds: Normal heart sounds. No murmur heard.      Pulmonary:      Effort: Pulmonary effort is normal. No respiratory distress.      Breath sounds: Normal breath sounds.   Abdominal:      General: There is no distension.      Palpations: Abdomen is soft.      Tenderness: There is no abdominal tenderness.   Musculoskeletal:      Cervical back: Normal range of motion.      Right lower leg: No edema.      Left lower leg: No edema.   Lymphadenopathy:      Cervical: No cervical adenopathy.   Neurological:      Mental Status: She is alert. Mental status is at baseline.   Psychiatric:         Mood and Affect: Mood normal.         Behavior: Behavior normal.         Assessment/Plan    Diagnosis Plan   1. Migraine with aura and without status migrainosus, not intractable  Aimovig 70 MG/ML prefilled syringe    ubrogepant (UBRELVY) 100 MG tablet   2. RLS (restless legs syndrome)  pregabalin (Lyrica) 75 MG capsule   3. High risk medication use        No orders of the defined types were placed in this encounter.    Restless leg syndrome; has tried Mirapex however due to adverse effects was transitioned to Lyrica.  Failed gabapentin in the past.  States Lyrica has provided good relief, Kurtis reviewed appropriate, no signs of abuse, misuse, will refill.  Follow-up in 3 months, sooner if needed.    Migraine; doing well on Aimovig, Ubrelvy, no adverse effects, will continue.        This document has been electronically signed by Adis Gould MD on January 20, 2022 17:25 CST    EMR Dragon/Transciption Disclaimer: Some of this note may be an electronic transcription/translation of spoken language to printed text.  The electronic translation of spoken language may permit erroneous, or at times, nonsensical words or phrases to be inadvertently transcribed. Although I have reviewed the note for such errors, some may still exist.

## 2022-01-21 ENCOUNTER — TELEPHONE (OUTPATIENT)
Dept: FAMILY MEDICINE CLINIC | Facility: CLINIC | Age: 47
End: 2022-01-21

## 2022-01-24 ENCOUNTER — TELEPHONE (OUTPATIENT)
Dept: FAMILY MEDICINE CLINIC | Facility: CLINIC | Age: 47
End: 2022-01-24

## 2022-01-24 NOTE — TELEPHONE ENCOUNTER
Sent in PA for Aimovig through [email or web address]      Approved from 1/24/22 - 1/23/23 for 12 fills.    Faxed to pharmacy.

## 2022-02-06 DIAGNOSIS — G47.00 INSOMNIA, UNSPECIFIED TYPE: ICD-10-CM

## 2022-02-06 DIAGNOSIS — F41.9 ANXIETY: ICD-10-CM

## 2022-02-06 DIAGNOSIS — R51.9 NONINTRACTABLE HEADACHE, UNSPECIFIED CHRONICITY PATTERN, UNSPECIFIED HEADACHE TYPE: ICD-10-CM

## 2022-02-06 DIAGNOSIS — R61 NIGHT SWEATS: ICD-10-CM

## 2022-02-07 RX ORDER — IBUPROFEN 600 MG/1
TABLET ORAL
Qty: 90 TABLET | Refills: 1 | OUTPATIENT
Start: 2022-02-07

## 2022-02-07 RX ORDER — TRAZODONE HYDROCHLORIDE 50 MG/1
TABLET ORAL
Qty: 60 TABLET | Refills: 3 | OUTPATIENT
Start: 2022-02-07

## 2022-02-07 RX ORDER — MEDROXYPROGESTERONE ACETATE 5 MG/1
TABLET ORAL
Qty: 30 TABLET | Refills: 3 | OUTPATIENT
Start: 2022-02-07

## 2022-02-07 RX ORDER — ESTRADIOL 0.5 MG/1
TABLET ORAL
Qty: 30 TABLET | Refills: 3 | OUTPATIENT
Start: 2022-02-07

## 2022-02-07 NOTE — TELEPHONE ENCOUNTER
Rx Refill Note  Requested Prescriptions     Refused Prescriptions Disp Refills   • ibuprofen (ADVIL,MOTRIN) 600 MG tablet [Pharmacy Med Name: ibuprofen 600 mg tablet] 90 tablet 1     Sig: TAKE ONE TABLET BY MOUTH EVERY 8 HOURS AS NEEDED for moderate pain   • medroxyPROGESTERone (PROVERA) 5 MG tablet [Pharmacy Med Name: medroxyprogesterone 5 mg tablet] 30 tablet 3     Sig: TAKE ONE TABLET BY MOUTH DAILY   • estradiol (ESTRACE) 0.5 MG tablet [Pharmacy Med Name: estradiol 0.5 mg tablet] 30 tablet 3     Sig: TAKE ONE TABLET BY MOUTH DAILY   • traZODone (DESYREL) 50 MG tablet [Pharmacy Med Name: trazodone 50 mg tablet] 60 tablet 3     Sig: TAKE 1 TO 2 TABLETS BY MOUTH AT BEDTIME AS NEEDED FOR ANXIETY OR INSOMNIA      Last office visit with prescribing clinician: 1/20/2022      Next office visit with prescribing clinician: 4/21/2022   {TIP  Encounters:    RX SENT ON 12/13/21 WITH REFILLS         Roseanne Pacheco LPN  02/07/22, 09:37 CST

## 2022-04-06 ENCOUNTER — TELEPHONE (OUTPATIENT)
Dept: FAMILY MEDICINE CLINIC | Facility: CLINIC | Age: 47
End: 2022-04-06

## 2022-04-06 NOTE — TELEPHONE ENCOUNTER
Received a message on [email or web address] that Ubrelvy needed a PA. Sent in through [employer].     Sent in PA

## 2022-04-21 ENCOUNTER — OFFICE VISIT (OUTPATIENT)
Dept: FAMILY MEDICINE CLINIC | Facility: CLINIC | Age: 47
End: 2022-04-21

## 2022-04-21 VITALS
OXYGEN SATURATION: 99 % | BODY MASS INDEX: 28.09 KG/M2 | SYSTOLIC BLOOD PRESSURE: 115 MMHG | HEART RATE: 86 BPM | TEMPERATURE: 98 F | WEIGHT: 168.6 LBS | HEIGHT: 65 IN | DIASTOLIC BLOOD PRESSURE: 78 MMHG

## 2022-04-21 DIAGNOSIS — G25.81 RLS (RESTLESS LEGS SYNDROME): Primary | ICD-10-CM

## 2022-04-21 DIAGNOSIS — J30.2 SEASONAL ALLERGIC RHINITIS: ICD-10-CM

## 2022-04-21 DIAGNOSIS — G43.109 MIGRAINE WITH AURA AND WITHOUT STATUS MIGRAINOSUS, NOT INTRACTABLE: ICD-10-CM

## 2022-04-21 DIAGNOSIS — J30.2 SEASONAL ALLERGIC RHINITIS, UNSPECIFIED TRIGGER: ICD-10-CM

## 2022-04-21 DIAGNOSIS — F41.9 ANXIETY: ICD-10-CM

## 2022-04-21 DIAGNOSIS — J45.909 UNCOMPLICATED ASTHMA, UNSPECIFIED ASTHMA SEVERITY, UNSPECIFIED WHETHER PERSISTENT: ICD-10-CM

## 2022-04-21 DIAGNOSIS — H40.9 GLAUCOMA, UNSPECIFIED GLAUCOMA TYPE, UNSPECIFIED LATERALITY: ICD-10-CM

## 2022-04-21 DIAGNOSIS — R51.9 NONINTRACTABLE HEADACHE, UNSPECIFIED CHRONICITY PATTERN, UNSPECIFIED HEADACHE TYPE: ICD-10-CM

## 2022-04-21 DIAGNOSIS — G47.00 INSOMNIA, UNSPECIFIED TYPE: ICD-10-CM

## 2022-04-21 DIAGNOSIS — R61 NIGHT SWEATS: ICD-10-CM

## 2022-04-21 PROCEDURE — 99214 OFFICE O/P EST MOD 30 MIN: CPT | Performed by: STUDENT IN AN ORGANIZED HEALTH CARE EDUCATION/TRAINING PROGRAM

## 2022-04-21 RX ORDER — FLUTICASONE PROPIONATE 50 MCG
2 SPRAY, SUSPENSION (ML) NASAL DAILY
Qty: 18.2 ML | Refills: 3 | Status: SHIPPED | OUTPATIENT
Start: 2022-04-21 | End: 2022-12-05 | Stop reason: SDUPTHER

## 2022-04-21 RX ORDER — ERENUMAB-AOOE 70 MG/ML
1 INJECTION SUBCUTANEOUS
Qty: 1.12 ML | Refills: 6 | Status: SHIPPED | OUTPATIENT
Start: 2022-04-21 | End: 2022-05-24

## 2022-04-21 RX ORDER — ESTRADIOL 0.5 MG/1
0.5 TABLET ORAL DAILY
Qty: 30 TABLET | Refills: 3 | Status: SHIPPED | OUTPATIENT
Start: 2022-04-21

## 2022-04-21 RX ORDER — MEDROXYPROGESTERONE ACETATE 5 MG/1
5 TABLET ORAL DAILY
Qty: 30 TABLET | Refills: 3 | Status: SHIPPED | OUTPATIENT
Start: 2022-04-21 | End: 2022-12-05 | Stop reason: SDUPTHER

## 2022-04-21 RX ORDER — PREGABALIN 75 MG/1
CAPSULE ORAL
Qty: 60 CAPSULE | Refills: 2 | Status: SHIPPED | OUTPATIENT
Start: 2022-04-21 | End: 2022-05-24 | Stop reason: SDUPTHER

## 2022-04-21 RX ORDER — LORATADINE AND PSEUDOEPHEDRINE SULFATE 10; 240 MG/1; MG/1
1 TABLET, EXTENDED RELEASE ORAL DAILY
Qty: 90 TABLET | Refills: 1 | Status: SHIPPED | OUTPATIENT
Start: 2022-04-21 | End: 2022-12-05 | Stop reason: SDUPTHER

## 2022-04-21 RX ORDER — ALBUTEROL SULFATE 90 UG/1
2 AEROSOL, METERED RESPIRATORY (INHALATION) EVERY 4 HOURS PRN
Qty: 18 G | Refills: 3 | Status: SHIPPED | OUTPATIENT
Start: 2022-04-21 | End: 2023-03-06 | Stop reason: SDUPTHER

## 2022-04-21 RX ORDER — LATANOPROST 50 UG/ML
1 SOLUTION/ DROPS OPHTHALMIC DAILY
Qty: 7.5 ML | Refills: 0 | Status: SHIPPED | OUTPATIENT
Start: 2022-04-21 | End: 2022-12-05 | Stop reason: SDUPTHER

## 2022-04-21 RX ORDER — TRAZODONE HYDROCHLORIDE 100 MG/1
100 TABLET ORAL NIGHTLY
Qty: 90 TABLET | Refills: 3 | Status: SHIPPED | OUTPATIENT
Start: 2022-04-21 | End: 2023-03-06 | Stop reason: SDUPTHER

## 2022-04-21 RX ORDER — IBUPROFEN 600 MG/1
600 TABLET ORAL EVERY 8 HOURS PRN
Qty: 90 TABLET | Refills: 1 | Status: SHIPPED | OUTPATIENT
Start: 2022-04-21 | End: 2022-12-05 | Stop reason: SDUPTHER

## 2022-04-29 DIAGNOSIS — Z71.6 ENCOUNTER FOR SMOKING CESSATION COUNSELING: ICD-10-CM

## 2022-04-29 RX ORDER — BUPROPION HYDROCHLORIDE 150 MG/1
TABLET, EXTENDED RELEASE ORAL
Qty: 60 TABLET | Refills: 2 | OUTPATIENT
Start: 2022-04-29

## 2022-04-29 NOTE — TELEPHONE ENCOUNTER
Rx Refill Note  Requested Prescriptions     Refused Prescriptions Disp Refills   • buPROPion SR (WELLBUTRIN SR) 150 MG 12 hr tablet [Pharmacy Med Name: bupropion HCl  mg tablet,12 hr sustained-release] 60 tablet 2     Sig: TAKE ONE TABLET BY MOUTH DAILY FOR 3 DAYS THEN TAKE ONE TABLET TWICE DAILY MAX  MG PER DAY      Last office visit with prescribing clinician: 4/21/2022      Next office visit with prescribing clinician: 5/24/2022   {TIP  Encounters:    discontinued on 4/21/2022 by Adis Gould MD.          Roseanne Pacheco LPN  04/29/22, 09:22 CDT

## 2022-05-06 ENCOUNTER — TELEPHONE (OUTPATIENT)
Dept: FAMILY MEDICINE CLINIC | Facility: CLINIC | Age: 47
End: 2022-05-06

## 2022-05-06 NOTE — TELEPHONE ENCOUNTER
Patient called asking about some paperwork that was to have been sent via email. They were from [employer] (?). They asked her to call and see if they were received, filled out and sent back stated that they were sent a couple weeks ago.   Any questions please call patient number is correct in the chart

## 2022-05-06 NOTE — TELEPHONE ENCOUNTER
Called pt to let her know that we have not received anything and that we don't receive things through email. I asked what kind of paperwork it was and she said it was just for her insurance needing to know about her past medical history and current medication. I gave her the fax number and told her to have them fax it. She voiced understanding.

## 2022-05-24 ENCOUNTER — OFFICE VISIT (OUTPATIENT)
Dept: FAMILY MEDICINE CLINIC | Facility: CLINIC | Age: 47
End: 2022-05-24

## 2022-05-24 ENCOUNTER — TELEPHONE (OUTPATIENT)
Dept: FAMILY MEDICINE CLINIC | Facility: CLINIC | Age: 47
End: 2022-05-24

## 2022-05-24 VITALS
OXYGEN SATURATION: 99 % | HEIGHT: 65 IN | WEIGHT: 169 LBS | HEART RATE: 81 BPM | TEMPERATURE: 97.1 F | SYSTOLIC BLOOD PRESSURE: 132 MMHG | BODY MASS INDEX: 28.16 KG/M2 | DIASTOLIC BLOOD PRESSURE: 88 MMHG

## 2022-05-24 DIAGNOSIS — G43.109 MIGRAINE WITH AURA AND WITHOUT STATUS MIGRAINOSUS, NOT INTRACTABLE: Primary | ICD-10-CM

## 2022-05-24 DIAGNOSIS — G25.81 RLS (RESTLESS LEGS SYNDROME): ICD-10-CM

## 2022-05-24 PROCEDURE — 99214 OFFICE O/P EST MOD 30 MIN: CPT | Performed by: STUDENT IN AN ORGANIZED HEALTH CARE EDUCATION/TRAINING PROGRAM

## 2022-05-24 RX ORDER — PREGABALIN 75 MG/1
CAPSULE ORAL
Qty: 60 CAPSULE | Refills: 2 | Status: SHIPPED | OUTPATIENT
Start: 2022-05-24 | End: 2022-11-03

## 2022-08-15 DIAGNOSIS — M54.2 NECK PAIN: ICD-10-CM

## 2022-08-15 RX ORDER — CYCLOBENZAPRINE HCL 5 MG
5 TABLET ORAL 2 TIMES DAILY PRN
Qty: 90 TABLET | Refills: 3 | Status: SHIPPED | OUTPATIENT
Start: 2022-08-15

## 2022-08-15 NOTE — TELEPHONE ENCOUNTER
Rx Refill Note  Requested Prescriptions     Pending Prescriptions Disp Refills   • cyclobenzaprine (FLEXERIL) 5 MG tablet 90 tablet 3     Sig: Take 1 tablet by mouth 2 (Two) Times a Day As Needed for Muscle Spasms.      Last office visit with prescribing clinician: 5/24/2022      Next office visit with prescribing clinician: 8/25/2022            Melisa Land MA  08/15/22, 08:13 CDT

## 2022-08-29 ENCOUNTER — TELEPHONE (OUTPATIENT)
Dept: FAMILY MEDICINE CLINIC | Facility: CLINIC | Age: 47
End: 2022-08-29

## 2022-10-18 DIAGNOSIS — J30.2 SEASONAL ALLERGIC RHINITIS: ICD-10-CM

## 2022-10-19 RX ORDER — DIPHENHYDRAMINE HYDROCHLORIDE 25 MG/1
CAPSULE ORAL
Qty: 120 CAPSULE | Refills: 4 | Status: SHIPPED | OUTPATIENT
Start: 2022-10-19 | End: 2023-03-06 | Stop reason: SDUPTHER

## 2022-10-19 NOTE — TELEPHONE ENCOUNTER
Rx Refill Note  Requested Prescriptions     Pending Prescriptions Disp Refills   • Banophen 25 MG capsule [Pharmacy Med Name: Banophen 25 mg capsule] 120 capsule 4     Sig: TAKE ONE CAPSULE BY MOUTH DAILY      Last office visit with prescribing clinician: 5/24/2022      Next office visit with prescribing clinician: Visit date not found            Melisa Land MA  10/19/22, 08:00 CDT

## 2022-11-02 DIAGNOSIS — G25.81 RLS (RESTLESS LEGS SYNDROME): ICD-10-CM

## 2022-11-02 NOTE — TELEPHONE ENCOUNTER
Rx Refill Note  Requested Prescriptions     Pending Prescriptions Disp Refills   • pregabalin (LYRICA) 75 MG capsule [Pharmacy Med Name: pregabalin 75 mg capsule] 60 capsule 2     Sig: TAKE ONE TABLET BY MOUTH EVERY EVENING MAY INCREASE TO 150MG AS NEEDED      Last office visit with prescribing clinician: 5/24/2022      Next office visit with prescribing clinician: 12/5/2022            Melisa Land MA  11/02/22, 08:13 CDT

## 2022-11-03 RX ORDER — PREGABALIN 75 MG/1
CAPSULE ORAL
Qty: 30 CAPSULE | Refills: 0 | Status: SHIPPED | OUTPATIENT
Start: 2022-11-03 | End: 2022-12-05 | Stop reason: SDUPTHER

## 2022-12-05 ENCOUNTER — OFFICE VISIT (OUTPATIENT)
Dept: FAMILY MEDICINE CLINIC | Facility: CLINIC | Age: 47
End: 2022-12-05

## 2022-12-05 ENCOUNTER — TELEPHONE (OUTPATIENT)
Dept: FAMILY MEDICINE CLINIC | Facility: CLINIC | Age: 47
End: 2022-12-05

## 2022-12-05 VITALS
BODY MASS INDEX: 28.32 KG/M2 | DIASTOLIC BLOOD PRESSURE: 86 MMHG | SYSTOLIC BLOOD PRESSURE: 128 MMHG | WEIGHT: 170 LBS | HEIGHT: 65 IN | TEMPERATURE: 98.2 F | HEART RATE: 97 BPM | OXYGEN SATURATION: 100 %

## 2022-12-05 DIAGNOSIS — Z12.11 SCREEN FOR COLON CANCER: ICD-10-CM

## 2022-12-05 DIAGNOSIS — I10 ESSENTIAL HYPERTENSION: ICD-10-CM

## 2022-12-05 DIAGNOSIS — Z23 FLU VACCINE NEED: ICD-10-CM

## 2022-12-05 DIAGNOSIS — R51.9 NONINTRACTABLE HEADACHE, UNSPECIFIED CHRONICITY PATTERN, UNSPECIFIED HEADACHE TYPE: ICD-10-CM

## 2022-12-05 DIAGNOSIS — J30.2 SEASONAL ALLERGIC RHINITIS, UNSPECIFIED TRIGGER: ICD-10-CM

## 2022-12-05 DIAGNOSIS — G25.81 RLS (RESTLESS LEGS SYNDROME): Primary | ICD-10-CM

## 2022-12-05 DIAGNOSIS — J30.2 SEASONAL ALLERGIC RHINITIS: ICD-10-CM

## 2022-12-05 DIAGNOSIS — R61 NIGHT SWEATS: ICD-10-CM

## 2022-12-05 DIAGNOSIS — G43.109 MIGRAINE WITH AURA AND WITHOUT STATUS MIGRAINOSUS, NOT INTRACTABLE: ICD-10-CM

## 2022-12-05 DIAGNOSIS — H40.9 GLAUCOMA, UNSPECIFIED GLAUCOMA TYPE, UNSPECIFIED LATERALITY: ICD-10-CM

## 2022-12-05 PROCEDURE — 90686 IIV4 VACC NO PRSV 0.5 ML IM: CPT | Performed by: STUDENT IN AN ORGANIZED HEALTH CARE EDUCATION/TRAINING PROGRAM

## 2022-12-05 PROCEDURE — 90471 IMMUNIZATION ADMIN: CPT | Performed by: STUDENT IN AN ORGANIZED HEALTH CARE EDUCATION/TRAINING PROGRAM

## 2022-12-05 PROCEDURE — 99214 OFFICE O/P EST MOD 30 MIN: CPT | Performed by: STUDENT IN AN ORGANIZED HEALTH CARE EDUCATION/TRAINING PROGRAM

## 2022-12-05 RX ORDER — LOSARTAN POTASSIUM 50 MG/1
50 TABLET ORAL DAILY
Qty: 90 TABLET | Refills: 3 | Status: SHIPPED | OUTPATIENT
Start: 2022-12-05 | End: 2023-03-06 | Stop reason: SDUPTHER

## 2022-12-05 RX ORDER — ATOGEPANT 60 MG/1
60 TABLET ORAL DAILY
Qty: 90 TABLET | Refills: 1 | Status: SHIPPED | OUTPATIENT
Start: 2022-12-05 | End: 2023-03-06 | Stop reason: SDUPTHER

## 2022-12-05 RX ORDER — LATANOPROST 50 UG/ML
1 SOLUTION/ DROPS OPHTHALMIC DAILY
Qty: 7.5 ML | Refills: 0 | Status: SHIPPED | OUTPATIENT
Start: 2022-12-05

## 2022-12-05 RX ORDER — MEDROXYPROGESTERONE ACETATE 5 MG/1
5 TABLET ORAL DAILY
Qty: 30 TABLET | Refills: 3 | Status: SHIPPED | OUTPATIENT
Start: 2022-12-05

## 2022-12-05 RX ORDER — PREGABALIN 75 MG/1
CAPSULE ORAL
Qty: 30 CAPSULE | Refills: 2 | Status: SHIPPED | OUTPATIENT
Start: 2022-12-05 | End: 2023-03-06 | Stop reason: SDUPTHER

## 2022-12-05 RX ORDER — LORATADINE AND PSEUDOEPHEDRINE SULFATE 10; 240 MG/1; MG/1
1 TABLET, EXTENDED RELEASE ORAL DAILY
Qty: 90 TABLET | Refills: 1 | Status: SHIPPED | OUTPATIENT
Start: 2022-12-05

## 2022-12-05 RX ORDER — FLUTICASONE PROPIONATE 50 MCG
2 SPRAY, SUSPENSION (ML) NASAL DAILY
Qty: 18.2 ML | Refills: 3 | Status: SHIPPED | OUTPATIENT
Start: 2022-12-05 | End: 2023-03-06 | Stop reason: SDUPTHER

## 2022-12-05 RX ORDER — IBUPROFEN 600 MG/1
600 TABLET ORAL EVERY 8 HOURS PRN
Qty: 90 TABLET | Refills: 1 | Status: SHIPPED | OUTPATIENT
Start: 2022-12-05

## 2022-12-05 NOTE — LETTER
December 6, 2022     Swapna Schaffer    Patient: Swapna Schaffer   YOB: 1975   Prior Auth Reference Number [id number]   [facility]   Plan Code [id number]   Health Care Provider FABIOLA BANERJEE   Prior Auth receipt date 12/05/22       To Whom it may concern     My patient, Swapna Schaffer, was recently prescribed Quilpta. Swapna gets migraines with aura,nighttime awakening, vision changes, nausea, vomiting, numbness, tingling, weakness, chest pain.  Additionally, takes Ubrelvy 100 mg as needed .At first,  Aimovig significantly reduced number of migraines, no adverse effects, hypertension, constipation. She was on Aimovig 70 mg monthly, states after approximately 2-3 weeks, has noticed increased migraines. Has increased the dosage of Aimovig with not much improvement.    Swapna also has a fear of needles and cannot give herself a shot. Therefore, her  has to do it. However, if her  is not available to give it, she has ended up in the ER repeatedly due to not being able to give herself the shot.     A prior authorization was sent in and denied stating that the member has not experienced greater than 15 headache days per month during the prior 6 months. My notes can show that she has had at least 15 headache days per month during the past month. I would ask that you would please appeal this decision on behalf of my patient. She is suffering from migraines and does not need to keep going to the Emergency Room.    Sincerely,        Fabiola Banerjee MD        CC: No Recipients

## 2022-12-07 ENCOUNTER — TELEPHONE (OUTPATIENT)
Dept: FAMILY MEDICINE CLINIC | Facility: CLINIC | Age: 47
End: 2022-12-07

## 2022-12-07 NOTE — TELEPHONE ENCOUNTER
Received a message that Ubrelvy needs a new PA. The last one ended on 11/17/23. Sent in new PA thru [employer]

## 2022-12-19 NOTE — PROGRESS NOTES
"Subjective:  Swapna Schaffer is a 47 y.o. female who presents for     Restless leg syndrome; currently on Lyrica 75 mg nightly, occasionally needs to take 150 mg nightly as needed.  Denies any adverse effects of medication, mood disturbance, sleep disturbance, states that it provides good relief.  No dizziness, falls, confusion, fatigue, sedation, weakness.    Migraine; at previous appointment, had Aimovig increased 140 mg monthly due to breakthrough migraines.  States that medication no longer providing relief, has been getting 5-6 migraines per month.  Was given sample of Nurtec, Ubrelvy, states that Ubrelvy provided good relief, denied any adverse effects.  Amenable for medication change at this time to provide improved symptom control.  Denied new headache symptoms, does not wake from sleep, denied vision changes, numbness, tingling, weakness, nausea, vomiting.    Patient Active Problem List   Diagnosis   • Menometrorrhagia   • Surgical follow-up care   • Anxiety   • Neck pain   • Nonintractable headache   • Hypertension   • Hyperparathyroidism (HCC)   • Atopic rhinitis   • Weight loss   • Cigarette smoker   • Erythema nodosum   • Gastroesophageal reflux disease   • Vitamin D deficiency   • Encounter for screening for malignant neoplasm of colon   • Family history of colon cancer     Vitals:    Vitals:    12/05/22 1650   BP: 128/86   BP Location: Right arm   Patient Position: Sitting   Cuff Size: Adult   Pulse: 97   Temp: 98.2 °F (36.8 °C)   SpO2: 100%   Weight: 77.1 kg (170 lb)   Height: 165.1 cm (65\")     Body mass index is 28.29 kg/m².      Current Outpatient Medications:   •  albuterol sulfate  (90 Base) MCG/ACT inhaler, Inhale 2 puffs Every 4 (Four) Hours As Needed for Wheezing., Disp: 18 g, Rfl: 3  •  Banophen 25 MG capsule, TAKE ONE CAPSULE BY MOUTH DAILY, Disp: 120 capsule, Rfl: 4  •  cyclobenzaprine (FLEXERIL) 5 MG tablet, Take 1 tablet by mouth 2 (Two) Times a Day As Needed for Muscle " Spasms., Disp: 90 tablet, Rfl: 3  •  Erenumab-aooe (AIMOVIG) 140 MG/ML prefilled syringe, Inject 1 mL under the skin into the appropriate area as directed Every 30 (Thirty) Days., Disp: 1 mL, Rfl: 5  •  estradiol (ESTRACE) 0.5 MG tablet, Take 1 tablet by mouth Daily., Disp: 30 tablet, Rfl: 3  •  fluticasone (Flonase) 50 MCG/ACT nasal spray, 2 sprays into the nostril(s) as directed by provider Daily., Disp: 18.2 mL, Rfl: 3  •  ibuprofen (ADVIL,MOTRIN) 600 MG tablet, Take 1 tablet by mouth Every 8 (Eight) Hours As Needed for Moderate Pain., Disp: 90 tablet, Rfl: 1  •  latanoprost (XALATAN) 0.005 % ophthalmic solution, Administer 1 drop to both eyes Daily., Disp: 7.5 mL, Rfl: 0  •  loratadine-pseudoephedrine (Claritin-D 24 Hour)  MG per 24 hr tablet, Take 1 tablet by mouth Daily., Disp: 90 tablet, Rfl: 1  •  losartan (COZAAR) 50 MG tablet, Take 1 tablet by mouth Daily., Disp: 90 tablet, Rfl: 3  •  medroxyPROGESTERone (Provera) 5 MG tablet, Take 1 tablet by mouth Daily., Disp: 30 tablet, Rfl: 3  •  pregabalin (LYRICA) 75 MG capsule, TAKE ONE TABLET BY MOUTH EVERY EVENING MAY INCREASE TO 150MG AS NEEDED, Disp: 30 capsule, Rfl: 2  •  traZODone (DESYREL) 100 MG tablet, Take 1 tablet by mouth Every Night., Disp: 90 tablet, Rfl: 3  •  ubrogepant (UBRELVY) 100 MG tablet, Take 1 tablet by mouth 1 (One) Time As Needed (at onset of migraine)., Disp: 10 tablet, Rfl: 5  •  Atogepant (Qulipta) 60 MG tablet, Take 1 tablet by mouth Daily., Disp: 90 tablet, Rfl: 1    Patient Active Problem List   Diagnosis   • Menometrorrhagia   • Surgical follow-up care   • Anxiety   • Neck pain   • Nonintractable headache   • Hypertension   • Hyperparathyroidism (HCC)   • Atopic rhinitis   • Weight loss   • Cigarette smoker   • Erythema nodosum   • Gastroesophageal reflux disease   • Vitamin D deficiency   • Encounter for screening for malignant neoplasm of colon   • Family history of colon cancer     Past Surgical History:   Procedure  Laterality Date   • HERNIA REPAIR      Umbilical hernia repair   • INJECTION OF MEDICATION  02/20/2016    Celestone (betamethasone) (1)  -  RICKI Perez   • OTHER SURGICAL HISTORY      Remove tendon sheath lesion 86883 (1)  - Ganglion cyst of the left wrist   • TUBAL ABDOMINAL LIGATION       Social History     Socioeconomic History   • Marital status:    Tobacco Use   • Smoking status: Every Day     Packs/day: 1.00     Years: 15.00     Pack years: 15.00     Types: Cigarettes   • Smokeless tobacco: Never   • Tobacco comments:     10-19 cigs/day   Vaping Use   • Vaping Use: Never used   Substance and Sexual Activity   • Alcohol use: No   • Drug use: No   • Sexual activity: Yes     Family History   Problem Relation Age of Onset   • Diabetes Other    • Hypertension Other    • Stroke Other    • Thyroid disease Other      No visits with results within 6 Month(s) from this visit.   Latest known visit with results is:   Office Visit on 10/04/2021   Component Date Value Ref Range Status   • Folate 10/05/2021 >20.00  4.78 - 24.20 ng/mL Final   • Homocysteine, Plasma (Quant) 10/05/2021 13.7  0.0 - 15.0 umol/L Final   • Methylmalonic Acid 10/05/2021 170  0 - 378 nmol/L Final   • Disclaimer: 10/05/2021 Comment   Final    This test was developed and its performance characteristics  determined by [facility]. It has not been cleared or approved  by the Food and Drug Administration.   • Magnesium 10/05/2021 1.9  1.6 - 2.6 mg/dL Final      Home Sleep Study  [place] [address]  Phone: [phone]  Fax: [phone]    Home Sleep Study Interpretation    Patient's Name: Swapna Schaffer  YOB: 1975  Primary Care Physician: Adis Gould MD  Date of Study: 08/16/2021  Ordering Provider: Gail ALEGRE  Interpreting Physician: Daniel Seo MD.  Date of Interpretation: 8/18/2021    Indications/Narrative:  Patient is a 45 y.o. female with  history of excessive daytime sleepiness,   at work sleepiness scale of 21, neck circumference 13 inches. This is a   technical adequate study.  I have personally reviewed the raw data and   summarized as below. Patient had a total recording time of 6 hours 24   minutes, total presumed sleep time of 4 hours and 55 minutes, mean oxygen   saturation 86% with a nrey of 90%, mean pulse rate 89 bpm, no significant   respiratory events overall pAHI 1.1 events per hour, RDI 1.4 events per   hour, sleep latency 23 minutes, REM latency 92 minutes, snoring was mild.    Impression:  1. After review of this home sleep testing data, there is no evidence of   significant sleep disordered breathing, overall P AHI 1.1 events per hour    Recommendation:  1. If clinical suspicion of sleep disordered breathing is high recommend   in lab diagnostic PSG  2. Home sleep testing may underestimate diagnosis and severity of his   sleep apnea, it is based on presumed sleep time  3. Consider other possible etiologies of this patient symptoms including   but not limited to idiopathic hypersomnia, insufficient sleep syndrome,   medications, undiagnosed sleep disordered breathing, poor sleep hygiene   practices.  4. Clinical correlation recommended.    Interpreted by:    Daniel Seo MD, FACP, FCCP  Diplomate in Pulmonary & Sleep Medicine    This document has been electronically signed by Daniel Seo MD on   August 18, 2021 14:05 CDT      EMR Dragon/Transcription disclaimer:   Some of this note may be an electronic transcription/translation of spoken   language to printed text. The electronic translation of spoken language   may permit erroneous, or at times, nonsensical words or phrases to be   inadvertently transcribed; Although I have reviewed the note for such   errors, some may still exist.      [unfilled]  Immunization History   Administered Date(s) Administered   • COVID-19 (MODERNA) 1st, 2nd, 3rd Dose Only 11/21/2021,  12/26/2021   • FluLaval/Fluzone >6mos 10/04/2021, 12/05/2022     The following portions of the patient's history were reviewed and updated as appropriate: allergies, current medications, past family history, past medical history, past social history, past surgical history and problem list.    PHQ-9 Total Score:             Physical Exam  Constitutional:       Appearance: Normal appearance.   HENT:      Head: Normocephalic and atraumatic.      Right Ear: External ear normal.      Left Ear: External ear normal.   Eyes:      General:         Right eye: No discharge.         Left eye: No discharge.      Conjunctiva/sclera: Conjunctivae normal.   Cardiovascular:      Rate and Rhythm: Normal rate and regular rhythm.      Pulses: Normal pulses.      Heart sounds: Normal heart sounds. No murmur heard.  Pulmonary:      Effort: Pulmonary effort is normal. No respiratory distress.      Breath sounds: Normal breath sounds.   Abdominal:      General: There is no distension.      Palpations: Abdomen is soft.      Tenderness: There is no abdominal tenderness.   Musculoskeletal:      Cervical back: Normal range of motion.      Right lower leg: No edema.      Left lower leg: No edema.   Lymphadenopathy:      Cervical: No cervical adenopathy.   Neurological:      Mental Status: She is alert. Mental status is at baseline.   Psychiatric:         Mood and Affect: Mood normal.         Behavior: Behavior normal.         Assessment & Plan    Diagnosis Plan   1. RLS (restless legs syndrome)  pregabalin (LYRICA) 75 MG capsule      2. Screen for colon cancer  Ambulatory Referral For Screening Colonoscopy      3. Migraine with aura and without status migrainosus, not intractable  ubrogepant (UBRELVY) 100 MG tablet    Atogepant (Qulipta) 60 MG tablet      4. Seasonal allergic rhinitis, unspecified trigger  loratadine-pseudoephedrine (Claritin-D 24 Hour)  MG per 24 hr tablet      5. Glaucoma, unspecified glaucoma type, unspecified  laterality  latanoprost (XALATAN) 0.005 % ophthalmic solution      6. Essential hypertension  losartan (COZAAR) 50 MG tablet      7. Night sweats  medroxyPROGESTERone (Provera) 5 MG tablet      8. Seasonal allergic rhinitis  fluticasone (Flonase) 50 MCG/ACT nasal spray      9. Nonintractable headache, unspecified chronicity pattern, unspecified headache type  ibuprofen (ADVIL,MOTRIN) 600 MG tablet      10. Flu vaccine need  FluLaval/Fluzone >6 mos (1208-0208)         Orders Placed This Encounter   Procedures   • FluLaval/Fluzone >6 mos ([id number])   • Ambulatory Referral For Screening Colonoscopy     Referral Priority:   Routine     Referral Type:   Diagnostic Medical     Referral Reason:   Specialty Services Required     Referred to Provider:   Ashish Pfeiffer DO     Requested Specialty:   Gastroenterology     Number of Visits Requested:   1     Restless leg syndrome; currently on Lyrica 75 mg nightly, states providing good relief, no adverse effects, Kurtis reviewed appropriate, no signs of abuse, misuse, will refill.     Migraine; uncontrolled, after discussion with patient, will switch to Qulipta/Ubrelvy to aid in improved migraine/symptom control.  Counseled patient on possible verse effects, benefits, voiced understanding, agreeable to plan.  Follow-up in 1 month or sooner if needed.    Patient additionally has refills as above, denies any adverse effects of medication, states providing good relief, will refill.          This document has been electronically signed by Adis Gould MD on December 19, 2022 12:41 CST    EMR Dragon/Transciption Disclaimer: Some of this note may be an electronic transcription/translation of spoken language to printed text.  The electronic translation of spoken language may permit erroneous, or at times, nonsensical words or phrases to be inadvertently transcribed. Although I have reviewed the note for such errors, some may still exist.

## 2023-01-12 NOTE — TELEPHONE ENCOUNTER
"This was denied due to needing to \"have experienced 15 headache days or less per month during the prior 6 months\". Dr Gould stated that she has a diagnosis of migraines (a severe type of headache) and that you experience more than 15 headache days per month during the prior 6 months.  "

## 2023-02-07 DIAGNOSIS — G43.109 MIGRAINE WITH AURA AND WITHOUT STATUS MIGRAINOSUS, NOT INTRACTABLE: ICD-10-CM

## 2023-02-07 NOTE — TELEPHONE ENCOUNTER
Rx Refill Note  Requested Prescriptions     Pending Prescriptions Disp Refills   • Erenumab-aooe (AIMOVIG) 140 MG/ML auto-injector 1 mL 5     Sig: Inject 1 mL under the skin into the appropriate area as directed Every 30 (Thirty) Days.      Last office visit with prescribing clinician: 12/5/2022   Last telemedicine visit with prescribing clinician: 3/6/2023   Next office visit with prescribing clinician: 3/6/2023                         Would you like a call back once the refill request has been completed: [] Yes [] No    If the office needs to give you a call back, can they leave a voicemail: [] Yes [] No    Emily Kowalski MA  02/07/23, 08:31 CST

## 2023-02-09 ENCOUNTER — DOCUMENTATION (OUTPATIENT)
Dept: FAMILY MEDICINE CLINIC | Facility: CLINIC | Age: 48
End: 2023-02-09
Payer: COMMERCIAL

## 2023-03-06 ENCOUNTER — OFFICE VISIT (OUTPATIENT)
Dept: FAMILY MEDICINE CLINIC | Facility: CLINIC | Age: 48
End: 2023-03-06
Payer: COMMERCIAL

## 2023-03-06 VITALS
BODY MASS INDEX: 28.49 KG/M2 | SYSTOLIC BLOOD PRESSURE: 120 MMHG | OXYGEN SATURATION: 96 % | HEART RATE: 85 BPM | HEIGHT: 65 IN | TEMPERATURE: 98.9 F | WEIGHT: 171 LBS | DIASTOLIC BLOOD PRESSURE: 80 MMHG

## 2023-03-06 DIAGNOSIS — Z13.220 LIPID SCREENING: Primary | ICD-10-CM

## 2023-03-06 DIAGNOSIS — G43.109 MIGRAINE WITH AURA AND WITHOUT STATUS MIGRAINOSUS, NOT INTRACTABLE: ICD-10-CM

## 2023-03-06 DIAGNOSIS — F41.9 ANXIETY: ICD-10-CM

## 2023-03-06 DIAGNOSIS — G47.00 INSOMNIA, UNSPECIFIED TYPE: ICD-10-CM

## 2023-03-06 DIAGNOSIS — E89.2 H/O PARATHYROIDECTOMY: ICD-10-CM

## 2023-03-06 DIAGNOSIS — G25.81 RLS (RESTLESS LEGS SYNDROME): ICD-10-CM

## 2023-03-06 DIAGNOSIS — J30.2 SEASONAL ALLERGIC RHINITIS: ICD-10-CM

## 2023-03-06 DIAGNOSIS — J45.909 UNCOMPLICATED ASTHMA, UNSPECIFIED ASTHMA SEVERITY, UNSPECIFIED WHETHER PERSISTENT: ICD-10-CM

## 2023-03-06 DIAGNOSIS — G43.909 MIGRAINE WITHOUT STATUS MIGRAINOSUS, NOT INTRACTABLE, UNSPECIFIED MIGRAINE TYPE: ICD-10-CM

## 2023-03-06 DIAGNOSIS — Z12.11 ENCOUNTER FOR SCREENING FOR MALIGNANT NEOPLASM OF COLON: ICD-10-CM

## 2023-03-06 DIAGNOSIS — I10 ESSENTIAL HYPERTENSION: ICD-10-CM

## 2023-03-06 PROCEDURE — 99214 OFFICE O/P EST MOD 30 MIN: CPT | Performed by: STUDENT IN AN ORGANIZED HEALTH CARE EDUCATION/TRAINING PROGRAM

## 2023-03-06 PROCEDURE — 3079F DIAST BP 80-89 MM HG: CPT | Performed by: STUDENT IN AN ORGANIZED HEALTH CARE EDUCATION/TRAINING PROGRAM

## 2023-03-06 PROCEDURE — 3074F SYST BP LT 130 MM HG: CPT | Performed by: STUDENT IN AN ORGANIZED HEALTH CARE EDUCATION/TRAINING PROGRAM

## 2023-03-06 RX ORDER — LOSARTAN POTASSIUM 50 MG/1
50 TABLET ORAL DAILY
Qty: 90 TABLET | Refills: 3 | Status: SHIPPED | OUTPATIENT
Start: 2023-03-06

## 2023-03-06 RX ORDER — FLUTICASONE PROPIONATE 50 MCG
2 SPRAY, SUSPENSION (ML) NASAL DAILY
Qty: 18.2 ML | Refills: 3 | Status: SHIPPED | OUTPATIENT
Start: 2023-03-06

## 2023-03-06 RX ORDER — ATOGEPANT 60 MG/1
60 TABLET ORAL DAILY
Qty: 90 TABLET | Refills: 1 | Status: SHIPPED | OUTPATIENT
Start: 2023-03-06

## 2023-03-06 RX ORDER — TRAZODONE HYDROCHLORIDE 100 MG/1
100 TABLET ORAL NIGHTLY
Qty: 90 TABLET | Refills: 3 | Status: SHIPPED | OUTPATIENT
Start: 2023-03-06

## 2023-03-06 RX ORDER — ALBUTEROL SULFATE 90 UG/1
2 AEROSOL, METERED RESPIRATORY (INHALATION) EVERY 4 HOURS PRN
Qty: 18 G | Refills: 3 | Status: SHIPPED | OUTPATIENT
Start: 2023-03-06

## 2023-03-06 RX ORDER — DIPHENHYDRAMINE HCL 25 MG
25 CAPSULE ORAL DAILY
Qty: 120 CAPSULE | Refills: 4 | Status: SHIPPED | OUTPATIENT
Start: 2023-03-06

## 2023-03-06 RX ORDER — PREGABALIN 75 MG/1
CAPSULE ORAL
Qty: 30 CAPSULE | Refills: 2 | Status: SHIPPED | OUTPATIENT
Start: 2023-03-06

## 2023-03-06 NOTE — PROGRESS NOTES
"Subjective:  Swapna Schaffer is a 47 y.o. female who presents for     Restless leg syndrome; currently on Lyrica 75 mg nightly, occasionally needs to take 150 mg nightly as needed.  Denies any adverse effects of medication, mood disturbance, sleep disturbance, states that it provides good relief.  No dizziness, falls, confusion, fatigue, sedation, weakness.     Migraine; at previous appointment, had Aimovig increased 140 mg monthly due to breakthrough migraines.  States that medication providing limited relief, has been getting 3-4 migraines per month.  Was given sample of Nurtec, Ubrelvy, states that Ubrelvy provided good relief, denied any adverse effects.  Amenable for medication change at this time to provide improved symptom control.  Denied new headache symptoms, does not wake from sleep, denied vision changes, numbness, tingling, weakness, nausea, vomiting.    Patient Active Problem List   Diagnosis   • Menometrorrhagia   • Surgical follow-up care   • Anxiety   • Neck pain   • Nonintractable headache   • Hypertension   • Hyperparathyroidism (HCC)   • Atopic rhinitis   • Weight loss   • Cigarette smoker   • Erythema nodosum   • Gastroesophageal reflux disease   • Vitamin D deficiency   • Encounter for screening for malignant neoplasm of colon   • Family history of colon cancer     Vitals:    Vitals:    03/06/23 1645   BP: 120/80   Pulse: 85   Temp: 98.9 °F (37.2 °C)   SpO2: 96%   Weight: 77.6 kg (171 lb)   Height: 165.1 cm (65\")     Body mass index is 28.46 kg/m².      Current Outpatient Medications:   •  albuterol sulfate  (90 Base) MCG/ACT inhaler, Inhale 2 puffs Every 4 (Four) Hours As Needed for Wheezing., Disp: 18 g, Rfl: 3  •  Atogepant (Qulipta) 60 MG tablet, Take 1 tablet by mouth Daily., Disp: 90 tablet, Rfl: 1  •  cyclobenzaprine (FLEXERIL) 5 MG tablet, Take 1 tablet by mouth 2 (Two) Times a Day As Needed for Muscle Spasms., Disp: 90 tablet, Rfl: 3  •  diphenhydrAMINE (Banophen) 25 " mg capsule, Take 1 capsule by mouth Daily., Disp: 120 capsule, Rfl: 4  •  Erenumab-aooe (AIMOVIG) 140 MG/ML auto-injector, Inject 1 mL under the skin into the appropriate area as directed Every 30 (Thirty) Days., Disp: 1 mL, Rfl: 5  •  estradiol (ESTRACE) 0.5 MG tablet, Take 1 tablet by mouth Daily., Disp: 30 tablet, Rfl: 3  •  fluticasone (Flonase) 50 MCG/ACT nasal spray, 2 sprays into the nostril(s) as directed by provider Daily., Disp: 18.2 mL, Rfl: 3  •  ibuprofen (ADVIL,MOTRIN) 600 MG tablet, Take 1 tablet by mouth Every 8 (Eight) Hours As Needed for Moderate Pain., Disp: 90 tablet, Rfl: 1  •  latanoprost (XALATAN) 0.005 % ophthalmic solution, Administer 1 drop to both eyes Daily., Disp: 7.5 mL, Rfl: 0  •  loratadine-pseudoephedrine (Claritin-D 24 Hour)  MG per 24 hr tablet, Take 1 tablet by mouth Daily., Disp: 90 tablet, Rfl: 1  •  losartan (COZAAR) 50 MG tablet, Take 1 tablet by mouth Daily., Disp: 90 tablet, Rfl: 3  •  medroxyPROGESTERone (Provera) 5 MG tablet, Take 1 tablet by mouth Daily., Disp: 30 tablet, Rfl: 3  •  pregabalin (LYRICA) 75 MG capsule, TAKE ONE TABLET BY MOUTH EVERY EVENING MAY INCREASE TO 150MG AS NEEDED, Disp: 30 capsule, Rfl: 2  •  traZODone (DESYREL) 100 MG tablet, Take 1 tablet by mouth Every Night., Disp: 90 tablet, Rfl: 3  •  ubrogepant (UBRELVY) 100 MG tablet, Take 1 tablet by mouth 1 (One) Time As Needed (at onset of migraine)., Disp: 10 tablet, Rfl: 5    Patient Active Problem List   Diagnosis   • Menometrorrhagia   • Surgical follow-up care   • Anxiety   • Neck pain   • Nonintractable headache   • Hypertension   • Hyperparathyroidism (HCC)   • Atopic rhinitis   • Weight loss   • Cigarette smoker   • Erythema nodosum   • Gastroesophageal reflux disease   • Vitamin D deficiency   • Encounter for screening for malignant neoplasm of colon   • Family history of colon cancer     Past Surgical History:   Procedure Laterality Date   • HERNIA REPAIR      Umbilical hernia repair   •  INJECTION OF MEDICATION  02/20/2016    Celestone (betamethasone) (1)  -  RICKI Perez   • OTHER SURGICAL HISTORY      Remove tendon sheath lesion 44009 (1)  - Ganglion cyst of the left wrist   • TUBAL ABDOMINAL LIGATION       Social History     Socioeconomic History   • Marital status:    Tobacco Use   • Smoking status: Every Day     Packs/day: 1.00     Years: 15.00     Pack years: 15.00     Types: Cigarettes   • Smokeless tobacco: Never   • Tobacco comments:     10-19 cigs/day   Vaping Use   • Vaping Use: Never used   Substance and Sexual Activity   • Alcohol use: No   • Drug use: No   • Sexual activity: Yes     Family History   Problem Relation Age of Onset   • Diabetes Other    • Hypertension Other    • Stroke Other    • Thyroid disease Other      No visits with results within 6 Month(s) from this visit.   Latest known visit with results is:   Office Visit on 10/04/2021   Component Date Value Ref Range Status   • Folate 10/05/2021 >20.00  4.78 - 24.20 ng/mL Final   • Homocysteine, Plasma (Quant) 10/05/2021 13.7  0.0 - 15.0 umol/L Final   • Methylmalonic Acid 10/05/2021 170  0 - 378 nmol/L Final   • Disclaimer: 10/05/2021 Comment   Final    This test was developed and its performance characteristics  determined by [facility]. It has not been cleared or approved  by the Food and Drug Administration.   • Magnesium 10/05/2021 1.9  1.6 - 2.6 mg/dL Final      Home Sleep Study  [place] [address]  Phone: [phone]  Fax: [phone]    Home Sleep Study Interpretation    Patient's Name: Swapna Schaffer  YOB: 1975  Primary Care Physician: Adis Gould MD  Date of Study: 08/16/2021  Ordering Provider: Gail ALEGRE  Interpreting Physician: Daniel Seo MD.  Date of Interpretation: 8/18/2021    Indications/Narrative:  Patient is a 45 y.o. female with history of excessive daytime sleepiness,   at work sleepiness scale  of 21, neck circumference 13 inches. This is a   technical adequate study.  I have personally reviewed the raw data and   summarized as below. Patient had a total recording time of 6 hours 24   minutes, total presumed sleep time of 4 hours and 55 minutes, mean oxygen   saturation 86% with a nery of 90%, mean pulse rate 89 bpm, no significant   respiratory events overall pAHI 1.1 events per hour, RDI 1.4 events per   hour, sleep latency 23 minutes, REM latency 92 minutes, snoring was mild.    Impression:  1. After review of this home sleep testing data, there is no evidence of   significant sleep disordered breathing, overall P AHI 1.1 events per hour    Recommendation:  1. If clinical suspicion of sleep disordered breathing is high recommend   in lab diagnostic PSG  2. Home sleep testing may underestimate diagnosis and severity of his   sleep apnea, it is based on presumed sleep time  3. Consider other possible etiologies of this patient symptoms including   but not limited to idiopathic hypersomnia, insufficient sleep syndrome,   medications, undiagnosed sleep disordered breathing, poor sleep hygiene   practices.  4. Clinical correlation recommended.    Interpreted by:    Daniel Seo MD, FACP, FCCP  Diplomate in Pulmonary & Sleep Medicine    This document has been electronically signed by Daniel Seo MD on   August 18, 2021 14:05 CDT      EMR Dragon/Transcription disclaimer:   Some of this note may be an electronic transcription/translation of spoken   language to printed text. The electronic translation of spoken language   may permit erroneous, or at times, nonsensical words or phrases to be   inadvertently transcribed; Although I have reviewed the note for such   errors, some may still exist.      [unfilled]  Immunization History   Administered Date(s) Administered   • COVID-19 (MODERNA) 1st, 2nd, 3rd Dose Only 11/21/2021, 12/26/2021   • FluLaval/Fluzone >6mos 10/04/2021, 12/05/2022     The  following portions of the patient's history were reviewed and updated as appropriate: allergies, current medications, past family history, past medical history, past social history, past surgical history and problem list.    PHQ-9 Total Score: 4           Physical Exam  Constitutional:       Appearance: Normal appearance.   HENT:      Head: Normocephalic and atraumatic.      Right Ear: External ear normal.      Left Ear: External ear normal.   Eyes:      General:         Right eye: No discharge.         Left eye: No discharge.      Conjunctiva/sclera: Conjunctivae normal.   Cardiovascular:      Rate and Rhythm: Normal rate and regular rhythm.      Pulses: Normal pulses.      Heart sounds: Normal heart sounds. No murmur heard.  Pulmonary:      Effort: Pulmonary effort is normal. No respiratory distress.      Breath sounds: Normal breath sounds.   Abdominal:      General: There is no distension.      Palpations: Abdomen is soft.      Tenderness: There is no abdominal tenderness.   Musculoskeletal:      Cervical back: Normal range of motion.      Right lower leg: No edema.      Left lower leg: No edema.   Lymphadenopathy:      Cervical: No cervical adenopathy.   Neurological:      Mental Status: She is alert. Mental status is at baseline.   Psychiatric:         Mood and Affect: Mood normal.         Behavior: Behavior normal.         Assessment & Plan    Diagnosis Plan   1. Lipid screening  Lipid Panel      2. RLS (restless legs syndrome)  pregabalin (LYRICA) 75 MG capsule      3. Uncomplicated asthma, unspecified asthma severity, unspecified whether persistent  albuterol sulfate  (90 Base) MCG/ACT inhaler      4. Migraine without status migrainosus, not intractable, unspecified migraine type  Atogepant (Qulipta) 60 MG tablet    CBC & Differential    Comprehensive Metabolic Panel      5. Seasonal allergic rhinitis  diphenhydrAMINE (Banophen) 25 mg capsule    fluticasone (Flonase) 50 MCG/ACT nasal spray      6.  Anxiety  traZODone (DESYREL) 100 MG tablet      7. Insomnia, unspecified type  traZODone (DESYREL) 100 MG tablet      8. Migraine with aura and without status migrainosus, not intractable  ubrogepant (UBRELVY) 100 MG tablet      9. Essential hypertension  losartan (COZAAR) 50 MG tablet      10. Encounter for screening for malignant neoplasm of colon  Ambulatory Referral For Screening Colonoscopy      11. H/O parathyroidectomy (HCC)  TSH Rfx On Abnormal To Free T4    PTH, Intact         Orders Placed This Encounter   Procedures   • Comprehensive Metabolic Panel     Order Specific Question:   Release to patient     Answer:   Immediate   • Lipid Panel   • TSH Rfx On Abnormal To Free T4   • PTH, Intact     Order Specific Question:   Release to patient     Answer:   Routine Release   • CBC Auto Differential   • Ambulatory Referral For Screening Colonoscopy     Referral Priority:   Routine     Referral Type:   Diagnostic Medical     Referral Reason:   Specialty Services Required     Referred to Provider:   Moustapha Hanson MD     Requested Specialty:   Gastroenterology     Number of Visits Requested:   1   • CBC & Differential     Order Specific Question:   Manual Differential     Answer:   No     Restless leg syndrome; well-controlled current medications, no signs of abuse, misuse, needs refills as above, Kurtis reviewed appropriate, will refill.  Follow-up in 3 months or sooner if needed.    Migraine; as above, will transition from Aimovig to Qulipta to hopefully achieve better migraine control, no relief.  Patient voiced understanding, agreeable with plan.    Multiple medication refills as above, doing well on current medication, no adverse effects, needs labs above, will obtain, adjust medication as needed/tolerated.          This document has been electronically signed by Adis Gould MD on March 20, 2023 21:56 CDT    EMR Dragon/Zoe Majesteiption Disclaimer: Some of this note may be an electronic  transcription/translation of spoken language to printed text.  The electronic translation of spoken language may permit erroneous, or at times, nonsensical words or phrases to be inadvertently transcribed. Although I have reviewed the note for such errors, some may still exist.

## 2023-03-07 ENCOUNTER — LAB (OUTPATIENT)
Dept: LAB | Facility: HOSPITAL | Age: 48
End: 2023-03-07
Payer: COMMERCIAL

## 2023-03-07 LAB
ALBUMIN SERPL-MCNC: 4.4 G/DL (ref 3.5–5.2)
ALBUMIN/GLOB SERPL: 1.5 G/DL
ALP SERPL-CCNC: 112 U/L (ref 39–117)
ALT SERPL W P-5'-P-CCNC: 29 U/L (ref 1–33)
ANION GAP SERPL CALCULATED.3IONS-SCNC: 12 MMOL/L (ref 5–15)
AST SERPL-CCNC: 24 U/L (ref 1–32)
BASOPHILS # BLD AUTO: 0.02 10*3/MM3 (ref 0–0.2)
BASOPHILS NFR BLD AUTO: 0.3 % (ref 0–1.5)
BILIRUB SERPL-MCNC: 0.3 MG/DL (ref 0–1.2)
BUN SERPL-MCNC: 8 MG/DL (ref 6–20)
BUN/CREAT SERPL: 9.4 (ref 7–25)
CALCIUM SPEC-SCNC: 11.1 MG/DL (ref 8.6–10.5)
CHLORIDE SERPL-SCNC: 106 MMOL/L (ref 98–107)
CHOLEST SERPL-MCNC: 236 MG/DL (ref 0–200)
CO2 SERPL-SCNC: 22 MMOL/L (ref 22–29)
CREAT SERPL-MCNC: 0.85 MG/DL (ref 0.57–1)
DEPRECATED RDW RBC AUTO: 39.9 FL (ref 37–54)
EGFRCR SERPLBLD CKD-EPI 2021: 84.1 ML/MIN/1.73
EOSINOPHIL # BLD AUTO: 0 10*3/MM3 (ref 0–0.4)
EOSINOPHIL NFR BLD AUTO: 0 % (ref 0.3–6.2)
ERYTHROCYTE [DISTWIDTH] IN BLOOD BY AUTOMATED COUNT: 13.1 % (ref 12.3–15.4)
GLOBULIN UR ELPH-MCNC: 3 GM/DL
GLUCOSE SERPL-MCNC: 97 MG/DL (ref 65–99)
HCT VFR BLD AUTO: 41.7 % (ref 34–46.6)
HDLC SERPL-MCNC: 45 MG/DL (ref 40–60)
HGB BLD-MCNC: 14 G/DL (ref 12–15.9)
IMM GRANULOCYTES # BLD AUTO: 0.02 10*3/MM3 (ref 0–0.05)
IMM GRANULOCYTES NFR BLD AUTO: 0.3 % (ref 0–0.5)
LDLC SERPL CALC-MCNC: 171 MG/DL (ref 0–100)
LDLC/HDLC SERPL: 3.75 {RATIO}
LYMPHOCYTES # BLD AUTO: 1.57 10*3/MM3 (ref 0.7–3.1)
LYMPHOCYTES NFR BLD AUTO: 23.8 % (ref 19.6–45.3)
MCH RBC QN AUTO: 28.3 PG (ref 26.6–33)
MCHC RBC AUTO-ENTMCNC: 33.6 G/DL (ref 31.5–35.7)
MCV RBC AUTO: 84.4 FL (ref 79–97)
MONOCYTES # BLD AUTO: 0.44 10*3/MM3 (ref 0.1–0.9)
MONOCYTES NFR BLD AUTO: 6.7 % (ref 5–12)
NEUTROPHILS NFR BLD AUTO: 4.55 10*3/MM3 (ref 1.7–7)
NEUTROPHILS NFR BLD AUTO: 68.9 % (ref 42.7–76)
NRBC BLD AUTO-RTO: 0 /100 WBC (ref 0–0.2)
PLATELET # BLD AUTO: 449 10*3/MM3 (ref 140–450)
PMV BLD AUTO: 11.1 FL (ref 6–12)
POTASSIUM SERPL-SCNC: 4.2 MMOL/L (ref 3.5–5.2)
PROT SERPL-MCNC: 7.4 G/DL (ref 6–8.5)
PTH-INTACT SERPL-MCNC: 64.7 PG/ML (ref 15–65)
RBC # BLD AUTO: 4.94 10*6/MM3 (ref 3.77–5.28)
SODIUM SERPL-SCNC: 140 MMOL/L (ref 136–145)
TRIGL SERPL-MCNC: 112 MG/DL (ref 0–150)
TSH SERPL DL<=0.05 MIU/L-ACNC: 0.47 UIU/ML (ref 0.27–4.2)
VLDLC SERPL-MCNC: 20 MG/DL (ref 5–40)
WBC NRBC COR # BLD: 6.6 10*3/MM3 (ref 3.4–10.8)

## 2023-03-07 PROCEDURE — 80053 COMPREHEN METABOLIC PANEL: CPT | Performed by: STUDENT IN AN ORGANIZED HEALTH CARE EDUCATION/TRAINING PROGRAM

## 2023-03-07 PROCEDURE — 80061 LIPID PANEL: CPT | Performed by: STUDENT IN AN ORGANIZED HEALTH CARE EDUCATION/TRAINING PROGRAM

## 2023-03-07 PROCEDURE — 85025 COMPLETE CBC W/AUTO DIFF WBC: CPT | Performed by: STUDENT IN AN ORGANIZED HEALTH CARE EDUCATION/TRAINING PROGRAM

## 2023-03-07 PROCEDURE — 84443 ASSAY THYROID STIM HORMONE: CPT | Performed by: STUDENT IN AN ORGANIZED HEALTH CARE EDUCATION/TRAINING PROGRAM

## 2023-03-07 PROCEDURE — 83970 ASSAY OF PARATHORMONE: CPT | Performed by: STUDENT IN AN ORGANIZED HEALTH CARE EDUCATION/TRAINING PROGRAM

## 2023-03-14 ENCOUNTER — TELEPHONE (OUTPATIENT)
Dept: FAMILY MEDICINE CLINIC | Facility: CLINIC | Age: 48
End: 2023-03-14
Payer: COMMERCIAL

## 2023-03-14 NOTE — TELEPHONE ENCOUNTER
Patient would like a call in regards to her lab results. She said some of her levels were high and wondered if Dr. Gould wanted to start her on some medications. Let patient know the provider would need to review them first before the MA will call her in relation to what is recommended. Patient verbalized understanding.  Call back number: [phone]

## 2023-03-16 ENCOUNTER — TELEPHONE (OUTPATIENT)
Dept: FAMILY MEDICINE CLINIC | Facility: CLINIC | Age: 48
End: 2023-03-16

## 2023-05-08 DIAGNOSIS — R51.9 NONINTRACTABLE HEADACHE, UNSPECIFIED CHRONICITY PATTERN, UNSPECIFIED HEADACHE TYPE: ICD-10-CM

## 2023-05-09 RX ORDER — IBUPROFEN 600 MG/1
TABLET ORAL
Qty: 90 TABLET | Refills: 0 | Status: SHIPPED | OUTPATIENT
Start: 2023-05-09

## 2023-06-05 ENCOUNTER — PRIOR AUTHORIZATION (OUTPATIENT)
Dept: FAMILY MEDICINE CLINIC | Facility: CLINIC | Age: 48
End: 2023-06-05
Payer: COMMERCIAL

## 2023-09-20 ENCOUNTER — OFFICE VISIT (OUTPATIENT)
Dept: FAMILY MEDICINE CLINIC | Facility: CLINIC | Age: 48
End: 2023-09-20
Payer: COMMERCIAL

## 2023-09-20 VITALS
SYSTOLIC BLOOD PRESSURE: 122 MMHG | DIASTOLIC BLOOD PRESSURE: 76 MMHG | TEMPERATURE: 98 F | HEIGHT: 65 IN | HEART RATE: 92 BPM | WEIGHT: 169.3 LBS | OXYGEN SATURATION: 97 % | BODY MASS INDEX: 28.21 KG/M2

## 2023-09-20 DIAGNOSIS — G43.109 MIGRAINE WITH AURA AND WITHOUT STATUS MIGRAINOSUS, NOT INTRACTABLE: ICD-10-CM

## 2023-09-20 DIAGNOSIS — G25.81 RLS (RESTLESS LEGS SYNDROME): ICD-10-CM

## 2023-09-20 DIAGNOSIS — J30.2 SEASONAL ALLERGIC RHINITIS: ICD-10-CM

## 2023-09-20 DIAGNOSIS — R51.9 NONINTRACTABLE HEADACHE, UNSPECIFIED CHRONICITY PATTERN, UNSPECIFIED HEADACHE TYPE: ICD-10-CM

## 2023-09-20 DIAGNOSIS — M54.2 NECK PAIN: ICD-10-CM

## 2023-09-20 DIAGNOSIS — J45.909 UNCOMPLICATED ASTHMA, UNSPECIFIED ASTHMA SEVERITY, UNSPECIFIED WHETHER PERSISTENT: ICD-10-CM

## 2023-09-20 DIAGNOSIS — Z12.4 CERVICAL CANCER SCREENING: ICD-10-CM

## 2023-09-20 DIAGNOSIS — Z12.31 ENCOUNTER FOR SCREENING MAMMOGRAM FOR MALIGNANT NEOPLASM OF BREAST: Primary | ICD-10-CM

## 2023-09-20 PROCEDURE — 3078F DIAST BP <80 MM HG: CPT | Performed by: STUDENT IN AN ORGANIZED HEALTH CARE EDUCATION/TRAINING PROGRAM

## 2023-09-20 PROCEDURE — 3074F SYST BP LT 130 MM HG: CPT | Performed by: STUDENT IN AN ORGANIZED HEALTH CARE EDUCATION/TRAINING PROGRAM

## 2023-09-20 PROCEDURE — 1159F MED LIST DOCD IN RCRD: CPT | Performed by: STUDENT IN AN ORGANIZED HEALTH CARE EDUCATION/TRAINING PROGRAM

## 2023-09-20 PROCEDURE — 1160F RVW MEDS BY RX/DR IN RCRD: CPT | Performed by: STUDENT IN AN ORGANIZED HEALTH CARE EDUCATION/TRAINING PROGRAM

## 2023-09-20 PROCEDURE — 99214 OFFICE O/P EST MOD 30 MIN: CPT | Performed by: STUDENT IN AN ORGANIZED HEALTH CARE EDUCATION/TRAINING PROGRAM

## 2023-09-20 RX ORDER — PREGABALIN 75 MG/1
CAPSULE ORAL
Qty: 30 CAPSULE | Refills: 2 | Status: SHIPPED | OUTPATIENT
Start: 2023-09-20

## 2023-09-20 RX ORDER — IBUPROFEN 600 MG/1
600 TABLET ORAL EVERY 8 HOURS PRN
Qty: 90 TABLET | Refills: 3 | Status: SHIPPED | OUTPATIENT
Start: 2023-09-20

## 2023-09-20 RX ORDER — DIPHENHYDRAMINE HCL 25 MG
25 CAPSULE ORAL DAILY
Qty: 120 CAPSULE | Refills: 4 | Status: SHIPPED | OUTPATIENT
Start: 2023-09-20

## 2023-09-20 RX ORDER — CYCLOBENZAPRINE HCL 5 MG
5 TABLET ORAL 2 TIMES DAILY PRN
Qty: 90 TABLET | Refills: 3 | Status: SHIPPED | OUTPATIENT
Start: 2023-09-20

## 2023-09-20 RX ORDER — ALBUTEROL SULFATE 90 UG/1
2 AEROSOL, METERED RESPIRATORY (INHALATION) EVERY 4 HOURS PRN
Qty: 18 G | Refills: 3 | Status: SHIPPED | OUTPATIENT
Start: 2023-09-20

## 2023-09-20 NOTE — PROGRESS NOTES
"Subjective:  Swapna Schaffer is a 48 y.o. female who presents for     Migraines; Currently on Aimovig 140mg monthly, Ubrelvy 100mg as needed. States that has been having an issue getting Ubrelvy filled from pharmacy but states it works well.  Denies increased migraine frequency is, new symptoms, does not wake from sleep, happy current management, needs refills today.    Restless leg syndrome; currently on Lyrica 75 to 150 mg nightly as needed.  States that medication provided good relief, no adverse effects, no mood disturbance, sleep disturbance, increased use of medication.  Happy current management, needs refills today.    Patient Active Problem List   Diagnosis    Menometrorrhagia    Surgical follow-up care    Anxiety    Neck pain    Nonintractable headache    Hypertension    Hyperparathyroidism    Atopic rhinitis    Weight loss    Cigarette smoker    Erythema nodosum    Gastroesophageal reflux disease    Vitamin D deficiency    Encounter for screening for malignant neoplasm of colon    Family history of colon cancer     Vitals:    Vitals:    09/20/23 1616   BP: 122/76   Pulse: 92   Temp: 98 °F (36.7 °C)   TempSrc: Oral   SpO2: 97%   Weight: 76.8 kg (169 lb 4.8 oz)   Height: 165.1 cm (65\")     Body mass index is 28.17 kg/m².      Current Outpatient Medications:     albuterol sulfate  (90 Base) MCG/ACT inhaler, Inhale 2 puffs Every 4 (Four) Hours As Needed for Wheezing., Disp: 18 g, Rfl: 3    cyclobenzaprine (FLEXERIL) 5 MG tablet, Take 1 tablet by mouth 2 (Two) Times a Day As Needed for Muscle Spasms., Disp: 90 tablet, Rfl: 3    diphenhydrAMINE (Banophen) 25 mg capsule, Take 1 capsule by mouth Daily., Disp: 120 capsule, Rfl: 4    Erenumab-aooe (AIMOVIG) 140 MG/ML auto-injector, Inject 1 mL under the skin into the appropriate area as directed Every 30 (Thirty) Days., Disp: 1 mL, Rfl: 5    estradiol (ESTRACE) 0.5 MG tablet, Take 1 tablet by mouth Daily., Disp: 30 tablet, Rfl: 3    fluticasone " (Flonase) 50 MCG/ACT nasal spray, 2 sprays into the nostril(s) as directed by provider Daily., Disp: 18.2 mL, Rfl: 3    ibuprofen (ADVIL,MOTRIN) 600 MG tablet, Take 1 tablet by mouth Every 8 (Eight) Hours As Needed for Moderate Pain., Disp: 90 tablet, Rfl: 3    latanoprost (XALATAN) 0.005 % ophthalmic solution, Administer 1 drop to both eyes Daily., Disp: 7.5 mL, Rfl: 0    loratadine-pseudoephedrine (Claritin-D 24 Hour)  MG per 24 hr tablet, Take 1 tablet by mouth Daily., Disp: 90 tablet, Rfl: 1    losartan (COZAAR) 50 MG tablet, Take 1 tablet by mouth Daily., Disp: 90 tablet, Rfl: 3    medroxyPROGESTERone (PROVERA) 5 MG tablet, TAKE ONE TABLET BY MOUTH DAILY, Disp: 30 tablet, Rfl: 3    pregabalin (LYRICA) 75 MG capsule, TAKE ONE TABLET BY MOUTH EVERY EVENING MAY INCREASE TO 150MG AS NEEDED, Disp: 30 capsule, Rfl: 2    traZODone (DESYREL) 100 MG tablet, Take 1 tablet by mouth Every Night., Disp: 90 tablet, Rfl: 3    ubrogepant (UBRELVY) 100 MG tablet, Take 1 tablet by mouth 1 (One) Time As Needed (at onset of migraine)., Disp: 10 tablet, Rfl: 5    Patient Active Problem List   Diagnosis    Menometrorrhagia    Surgical follow-up care    Anxiety    Neck pain    Nonintractable headache    Hypertension    Hyperparathyroidism    Atopic rhinitis    Weight loss    Cigarette smoker    Erythema nodosum    Gastroesophageal reflux disease    Vitamin D deficiency    Encounter for screening for malignant neoplasm of colon    Family history of colon cancer     Past Surgical History:   Procedure Laterality Date    HERNIA REPAIR      Umbilical hernia repair    INJECTION OF MEDICATION  02/20/2016    Celestone (betamethasone) (1)  -  RICKI Perez    OTHER SURGICAL HISTORY      Remove tendon sheath lesion 95105 (1)  - Ganglion cyst of the left wrist    TUBAL ABDOMINAL LIGATION       Social History     Socioeconomic History    Marital status:    Tobacco Use    Smoking status: Every Day     Packs/day: 1.00     Years: 15.00      Pack years: 15.00     Types: Cigarettes    Smokeless tobacco: Never    Tobacco comments:     10-19 cigs/day   Vaping Use    Vaping Use: Never used   Substance and Sexual Activity    Alcohol use: No    Drug use: No    Sexual activity: Yes     Family History   Problem Relation Age of Onset    Diabetes Other     Hypertension Other     Stroke Other     Thyroid disease Other      No visits with results within 6 Month(s) from this visit.   Latest known visit with results is:   Office Visit on 03/06/2023   Component Date Value Ref Range Status    Glucose 03/07/2023 97  65 - 99 mg/dL Final    BUN 03/07/2023 8  6 - 20 mg/dL Final    Creatinine 03/07/2023 0.85  0.57 - 1.00 mg/dL Final    Sodium 03/07/2023 140  136 - 145 mmol/L Final    Potassium 03/07/2023 4.2  3.5 - 5.2 mmol/L Final    Chloride 03/07/2023 106  98 - 107 mmol/L Final    CO2 03/07/2023 22.0  22.0 - 29.0 mmol/L Final    Calcium 03/07/2023 11.1 (H)  8.6 - 10.5 mg/dL Final    Total Protein 03/07/2023 7.4  6.0 - 8.5 g/dL Final    Albumin 03/07/2023 4.4  3.5 - 5.2 g/dL Final    ALT (SGPT) 03/07/2023 29  1 - 33 U/L Final    AST (SGOT) 03/07/2023 24  1 - 32 U/L Final    Alkaline Phosphatase 03/07/2023 112  39 - 117 U/L Final    Total Bilirubin 03/07/2023 0.3  0.0 - 1.2 mg/dL Final    Globulin 03/07/2023 3.0  gm/dL Final    A/G Ratio 03/07/2023 1.5  g/dL Final    BUN/Creatinine Ratio 03/07/2023 9.4  7.0 - 25.0 Final    Anion Gap 03/07/2023 12.0  5.0 - 15.0 mmol/L Final    eGFR 03/07/2023 84.1  >60.0 mL/min/1.73 Final    Total Cholesterol 03/07/2023 236 (H)  0 - 200 mg/dL Final    Triglycerides 03/07/2023 112  0 - 150 mg/dL Final    HDL Cholesterol 03/07/2023 45  40 - 60 mg/dL Final    LDL Cholesterol  03/07/2023 171 (H)  0 - 100 mg/dL Final    VLDL Cholesterol 03/07/2023 20  5 - 40 mg/dL Final    LDL/HDL Ratio 03/07/2023 3.75   Final    TSH 03/07/2023 0.471  0.270 - 4.200 uIU/mL Final    PTH, Intact 03/07/2023 64.7  15.0 - 65.0 pg/mL Final    WBC 03/07/2023 6.60  3.40  - 10.80 10*3/mm3 Final    RBC 03/07/2023 4.94  3.77 - 5.28 10*6/mm3 Final    Hemoglobin 03/07/2023 14.0  12.0 - 15.9 g/dL Final    Hematocrit 03/07/2023 41.7  34.0 - 46.6 % Final    MCV 03/07/2023 84.4  79.0 - 97.0 fL Final    MCH 03/07/2023 28.3  26.6 - 33.0 pg Final    MCHC 03/07/2023 33.6  31.5 - 35.7 g/dL Final    RDW 03/07/2023 13.1  12.3 - 15.4 % Final    RDW-SD 03/07/2023 39.9  37.0 - 54.0 fl Final    MPV 03/07/2023 11.1  6.0 - 12.0 fL Final    Platelets 03/07/2023 449  140 - 450 10*3/mm3 Final    Neutrophil % 03/07/2023 68.9  42.7 - 76.0 % Final    Lymphocyte % 03/07/2023 23.8  19.6 - 45.3 % Final    Monocyte % 03/07/2023 6.7  5.0 - 12.0 % Final    Eosinophil % 03/07/2023 0.0 (L)  0.3 - 6.2 % Final    Basophil % 03/07/2023 0.3  0.0 - 1.5 % Final    Immature Grans % 03/07/2023 0.3  0.0 - 0.5 % Final    Neutrophils, Absolute 03/07/2023 4.55  1.70 - 7.00 10*3/mm3 Final    Lymphocytes, Absolute 03/07/2023 1.57  0.70 - 3.10 10*3/mm3 Final    Monocytes, Absolute 03/07/2023 0.44  0.10 - 0.90 10*3/mm3 Final    Eosinophils, Absolute 03/07/2023 0.00  0.00 - 0.40 10*3/mm3 Final    Basophils, Absolute 03/07/2023 0.02  0.00 - 0.20 10*3/mm3 Final    Immature Grans, Absolute 03/07/2023 0.02  0.00 - 0.05 10*3/mm3 Final    nRBC 03/07/2023 0.0  0.0 - 0.2 /100 WBC Final      Home Sleep Study  [facility]  [address] [address]  Phone: [phone]  Fax: [phone]    Home Sleep Study Interpretation    Patient's Name: Swapna Schaffer  YOB: 1975  Primary Care Physician: Adis Gould MD  Date of Study: 08/16/2021  Ordering Provider: Gail ALEGRE  Interpreting Physician: Daniel Seo MD.  Date of Interpretation: 8/18/2021    Indications/Narrative:  Patient is a 45 y.o. female with history of excessive daytime sleepiness,   at work sleepiness scale of 21, neck circumference 13 inches. This is a   technical adequate study.  I have personally  reviewed the raw data and   summarized as below. Patient had a total recording time of 6 hours 24   minutes, total presumed sleep time of 4 hours and 55 minutes, mean oxygen   saturation 86% with a nery of 90%, mean pulse rate 89 bpm, no significant   respiratory events overall pAHI 1.1 events per hour, RDI 1.4 events per   hour, sleep latency 23 minutes, REM latency 92 minutes, snoring was mild.    Impression:  1. After review of this home sleep testing data, there is no evidence of   significant sleep disordered breathing, overall P AHI 1.1 events per hour    Recommendation:  1. If clinical suspicion of sleep disordered breathing is high recommend   in lab diagnostic PSG  2. Home sleep testing may underestimate diagnosis and severity of his   sleep apnea, it is based on presumed sleep time  3. Consider other possible etiologies of this patient symptoms including   but not limited to idiopathic hypersomnia, insufficient sleep syndrome,   medications, undiagnosed sleep disordered breathing, poor sleep hygiene   practices.  4. Clinical correlation recommended.    Interpreted by:    Daniel Seo MD, FACP, FCCP  Diplomate in Pulmonary & Sleep Medicine    This document has been electronically signed by Daniel Seo MD on   August 18, 2021 14:05 CDT      EMR Dragon/Transcription disclaimer:   Some of this note may be an electronic transcription/translation of spoken   language to printed text. The electronic translation of spoken language   may permit erroneous, or at times, nonsensical words or phrases to be   inadvertently transcribed; Although I have reviewed the note for such   errors, some may still exist.      [unfilled]  Immunization History   Administered Date(s) Administered    COVID-19 (MODERNA) 1st,2nd,3rd Dose Monovalent 11/21/2021, 12/26/2021    Fluzone (or Fluarix & Flulaval for VFC) >6mos 10/04/2021, 12/05/2022     The following portions of the patient's history were reviewed and updated  as appropriate: allergies, current medications, past family history, past medical history, past social history, past surgical history and problem list.    PHQ-9 Total Score:           Physical Exam  Constitutional:       Appearance: Normal appearance.   HENT:      Head: Normocephalic and atraumatic.      Right Ear: External ear normal.      Left Ear: External ear normal.   Eyes:      General:         Right eye: No discharge.         Left eye: No discharge.      Conjunctiva/sclera: Conjunctivae normal.   Cardiovascular:      Rate and Rhythm: Normal rate and regular rhythm.      Pulses: Normal pulses.      Heart sounds: Normal heart sounds. No murmur heard.  Pulmonary:      Effort: Pulmonary effort is normal. No respiratory distress.      Breath sounds: Normal breath sounds.   Abdominal:      General: There is no distension.      Palpations: Abdomen is soft.      Tenderness: There is no abdominal tenderness.   Musculoskeletal:      Cervical back: Normal range of motion.      Right lower leg: No edema.      Left lower leg: No edema.   Lymphadenopathy:      Cervical: No cervical adenopathy.   Neurological:      Mental Status: She is alert. Mental status is at baseline.   Psychiatric:         Mood and Affect: Mood normal.         Behavior: Behavior normal.       Assessment & Plan    Diagnosis Plan   1. Encounter for screening mammogram for malignant neoplasm of breast  Mammo Screening Digital Tomosynthesis Bilateral With CAD      2. Uncomplicated asthma, unspecified asthma severity, unspecified whether persistent  albuterol sulfate  (90 Base) MCG/ACT inhaler      3. Neck pain  cyclobenzaprine (FLEXERIL) 5 MG tablet      4. Seasonal allergic rhinitis  diphenhydrAMINE (Banophen) 25 mg capsule      5. Migraine with aura and without status migrainosus, not intractable  ubrogepant (UBRELVY) 100 MG tablet    Erenumab-aooe (AIMOVIG) 140 MG/ML auto-injector      6. RLS (restless legs syndrome)  pregabalin (LYRICA) 75 MG  capsule      7. Nonintractable headache, unspecified chronicity pattern, unspecified headache type  ibuprofen (ADVIL,MOTRIN) 600 MG tablet      8. Cervical cancer screening  Ambulatory Referral to Obstetrics / Gynecology         Orders Placed This Encounter   Procedures    Mammo Screening Digital Tomosynthesis Bilateral With CAD     Standing Status:   Future     Standing Expiration Date:   9/20/2024     Order Specific Question:   Reason for Exam:     Answer:   breast cancer screening     Order Specific Question:   Patient Pregnant     Answer:   No     Order Specific Question:   Release to patient     Answer:   Routine Release [[id number]]    Ambulatory Referral to Obstetrics / Gynecology     Referral Priority:   Routine     Referral Type:   Consultation     Referral Reason:   Specialty Services Required     Requested Specialty:   Obstetrics and Gynecology     Number of Visits Requested:   1     Restless leg syndrome; well-controlled on current medications, no adverse effects, Kurtis reviewed and appropriate, no signs of abuse, misuse, will refill.  Follow-up in 3 months or sooner if needed.    Migraine; needs refill as above, no adverse effects of medication, providing relief, will refill.  Follow-up in 3 months or sooner if needed.    Additionally, patient needs mammogram, Pap smear.  Patient prefers female provider, will refer.         This document has been electronically signed by Adis Gould MD on September 20, 2023 18:19 CDT    EMR Dragon/Transciption Disclaimer: Some of this note may be an electronic transcription/translation of spoken language to printed text.  The electronic translation of spoken language may permit erroneous, or at times, nonsensical words or phrases to be inadvertently transcribed. Although I have reviewed the note for such errors, some may still exist.